# Patient Record
Sex: FEMALE | Race: WHITE | Employment: FULL TIME | URBAN - METROPOLITAN AREA
[De-identification: names, ages, dates, MRNs, and addresses within clinical notes are randomized per-mention and may not be internally consistent; named-entity substitution may affect disease eponyms.]

---

## 2017-10-10 ENCOUNTER — TRANSFERRED RECORDS (OUTPATIENT)
Dept: MULTI SPECIALTY CLINIC | Facility: CLINIC | Age: 37
End: 2017-10-10

## 2017-10-10 LAB — PAP-ABSTRACT: NORMAL

## 2018-04-11 ENCOUNTER — RECORDS - HEALTHEAST (OUTPATIENT)
Dept: LAB | Facility: HOSPITAL | Age: 38
End: 2018-04-11

## 2018-04-12 LAB — HBV SURFACE AB SERPL IA-ACNC: POSITIVE M[IU]/ML

## 2018-04-13 LAB
MEV IGG SER IA-ACNC: POSITIVE
MUV IGG SER QL IA: POSITIVE
QTF INTERPRETATION: NORMAL
QTF MITOGEN - NIL: >10 IU/ML
QTF NIL: 0.03 IU/ML
QTF RESULT: NEGATIVE
QTF TB ANTIGEN - NIL: 0.23 IU/ML

## 2018-10-10 ENCOUNTER — TRANSFERRED RECORDS (OUTPATIENT)
Dept: MULTI SPECIALTY CLINIC | Facility: CLINIC | Age: 38
End: 2018-10-10

## 2018-10-10 DIAGNOSIS — Z53.9 ERRONEOUS ENCOUNTER--DISREGARD: Primary | ICD-10-CM

## 2020-01-20 ENCOUNTER — APPOINTMENT (OUTPATIENT)
Dept: GENERAL RADIOLOGY | Facility: CLINIC | Age: 40
End: 2020-01-20
Attending: EMERGENCY MEDICINE
Payer: COMMERCIAL

## 2020-01-20 ENCOUNTER — HOSPITAL ENCOUNTER (EMERGENCY)
Facility: CLINIC | Age: 40
Discharge: HOME | End: 2020-01-21
Attending: EMERGENCY MEDICINE | Admitting: EMERGENCY MEDICINE
Payer: COMMERCIAL

## 2020-01-20 DIAGNOSIS — R05.9 COUGH: ICD-10-CM

## 2020-01-20 DIAGNOSIS — R06.02 SHORTNESS OF BREATH: ICD-10-CM

## 2020-01-20 DIAGNOSIS — R07.9 CHEST PAIN, UNSPECIFIED TYPE: ICD-10-CM

## 2020-01-20 LAB
ALBUMIN SERPL-MCNC: 3.6 G/DL (ref 3.4–5)
ALP SERPL-CCNC: 37 U/L (ref 40–150)
ALT SERPL W P-5'-P-CCNC: 21 U/L (ref 0–50)
ANION GAP SERPL CALCULATED.3IONS-SCNC: 5 MMOL/L (ref 3–14)
AST SERPL W P-5'-P-CCNC: 16 U/L (ref 0–45)
BASOPHILS # BLD AUTO: 0 10E9/L (ref 0–0.2)
BASOPHILS NFR BLD AUTO: 0.6 %
BILIRUB SERPL-MCNC: 0.2 MG/DL (ref 0.2–1.3)
BUN SERPL-MCNC: 11 MG/DL (ref 7–30)
CALCIUM SERPL-MCNC: 8.5 MG/DL (ref 8.5–10.1)
CHLORIDE SERPL-SCNC: 111 MMOL/L (ref 94–109)
CO2 SERPL-SCNC: 28 MMOL/L (ref 20–32)
CREAT SERPL-MCNC: 0.86 MG/DL (ref 0.52–1.04)
D DIMER PPP FEU-MCNC: 0.5 UG/ML FEU (ref 0–0.5)
DIFFERENTIAL METHOD BLD: NORMAL
EOSINOPHIL # BLD AUTO: 0.1 10E9/L (ref 0–0.7)
EOSINOPHIL NFR BLD AUTO: 2.1 %
ERYTHROCYTE [DISTWIDTH] IN BLOOD BY AUTOMATED COUNT: 11.9 % (ref 10–15)
GFR SERPL CREATININE-BSD FRML MDRD: 84 ML/MIN/{1.73_M2}
GLUCOSE SERPL-MCNC: 96 MG/DL (ref 70–99)
HCT VFR BLD AUTO: 38.1 % (ref 35–47)
HGB BLD-MCNC: 12.6 G/DL (ref 11.7–15.7)
IMM GRANULOCYTES # BLD: 0 10E9/L (ref 0–0.4)
IMM GRANULOCYTES NFR BLD: 0.2 %
LYMPHOCYTES # BLD AUTO: 2.2 10E9/L (ref 0.8–5.3)
LYMPHOCYTES NFR BLD AUTO: 43.6 %
MCH RBC QN AUTO: 31.3 PG (ref 26.5–33)
MCHC RBC AUTO-ENTMCNC: 33.1 G/DL (ref 31.5–36.5)
MCV RBC AUTO: 95 FL (ref 78–100)
MONOCYTES # BLD AUTO: 0.3 10E9/L (ref 0–1.3)
MONOCYTES NFR BLD AUTO: 5.3 %
NEUTROPHILS # BLD AUTO: 2.5 10E9/L (ref 1.6–8.3)
NEUTROPHILS NFR BLD AUTO: 48.2 %
NRBC # BLD AUTO: 0 10*3/UL
NRBC BLD AUTO-RTO: 0 /100
PLATELET # BLD AUTO: 210 10E9/L (ref 150–450)
POTASSIUM SERPL-SCNC: 3.8 MMOL/L (ref 3.4–5.3)
PROT SERPL-MCNC: 6.7 G/DL (ref 6.8–8.8)
RBC # BLD AUTO: 4.02 10E12/L (ref 3.8–5.2)
SODIUM SERPL-SCNC: 144 MMOL/L (ref 133–144)
TROPONIN I SERPL-MCNC: <0.015 UG/L (ref 0–0.04)
WBC # BLD AUTO: 5.1 10E9/L (ref 4–11)

## 2020-01-20 PROCEDURE — 93010 ELECTROCARDIOGRAM REPORT: CPT | Mod: Z6 | Performed by: EMERGENCY MEDICINE

## 2020-01-20 PROCEDURE — 99285 EMERGENCY DEPT VISIT HI MDM: CPT | Mod: 25 | Performed by: EMERGENCY MEDICINE

## 2020-01-20 PROCEDURE — 85025 COMPLETE CBC W/AUTO DIFF WBC: CPT | Performed by: EMERGENCY MEDICINE

## 2020-01-20 PROCEDURE — 80053 COMPREHEN METABOLIC PANEL: CPT | Performed by: EMERGENCY MEDICINE

## 2020-01-20 PROCEDURE — 96374 THER/PROPH/DIAG INJ IV PUSH: CPT | Performed by: EMERGENCY MEDICINE

## 2020-01-20 PROCEDURE — 85379 FIBRIN DEGRADATION QUANT: CPT | Performed by: EMERGENCY MEDICINE

## 2020-01-20 PROCEDURE — 93005 ELECTROCARDIOGRAM TRACING: CPT | Performed by: EMERGENCY MEDICINE

## 2020-01-20 PROCEDURE — 71046 X-RAY EXAM CHEST 2 VIEWS: CPT

## 2020-01-20 PROCEDURE — 84484 ASSAY OF TROPONIN QUANT: CPT | Performed by: EMERGENCY MEDICINE

## 2020-01-20 PROCEDURE — 25000128 H RX IP 250 OP 636: Performed by: EMERGENCY MEDICINE

## 2020-01-20 RX ORDER — KETOROLAC TROMETHAMINE 15 MG/ML
15 INJECTION, SOLUTION INTRAMUSCULAR; INTRAVENOUS ONCE
Status: COMPLETED | OUTPATIENT
Start: 2020-01-20 | End: 2020-01-20

## 2020-01-20 RX ADMIN — KETOROLAC TROMETHAMINE 15 MG: 15 INJECTION, SOLUTION INTRAMUSCULAR; INTRAVENOUS at 22:43

## 2020-01-20 ASSESSMENT — ENCOUNTER SYMPTOMS
WEAKNESS: 0
BACK PAIN: 0
COUGH: 1
BRUISES/BLEEDS EASILY: 0
SORE THROAT: 0
ABDOMINAL PAIN: 0
FEVER: 0
SHORTNESS OF BREATH: 1
CONFUSION: 0
DYSURIA: 0

## 2020-01-20 ASSESSMENT — MIFFLIN-ST. JEOR: SCORE: 1240.6

## 2020-01-20 NOTE — ED AVS SNAPSHOT
[facility], [place], Emergency Department  [address] [place] [address]  Phone:  [phone]                                    Sultana Greene   MRN: [id number]    Department:  [facility], Emergency Department   Date of Visit:  1/20/2020           After Visit Summary Signature Page    I have received my discharge instructions, and my questions have been answered. I have discussed any challenges I see with this plan with the nurse or doctor.    ..........................................................................................................................................  Patient/Patient Representative Signature      ..........................................................................................................................................  Patient Representative Print Name and Relationship to Patient    ..................................................               ................................................  Date                                   Time    ..........................................................................................................................................  Reviewed by Signature/Title    ...................................................              ..............................................  Date                                               Time          22EPIC Rev 08/18

## 2020-01-21 VITALS
DIASTOLIC BLOOD PRESSURE: 72 MMHG | HEIGHT: 68 IN | BODY MASS INDEX: 17.28 KG/M2 | RESPIRATION RATE: 16 BRPM | TEMPERATURE: 98.4 F | HEART RATE: 67 BPM | SYSTOLIC BLOOD PRESSURE: 100 MMHG | WEIGHT: 114 LBS | OXYGEN SATURATION: 99 %

## 2020-01-21 LAB — INTERPRETATION ECG - MUSE: NORMAL

## 2020-01-21 NOTE — ED PROVIDER NOTES
History     Chief Complaint   Patient presents with     Shortness of Breath     Cough     HPI  Sultana Greene is a 39 year old female who has a past medical history of migraine headaches who presents emergency department from home with her  with a complaint of chest pain and shortness of breath.  Patient complains of some chest pain and shortness of breath that started yesterday.  Patient describes the pain as a heaviness that is across to her anterior chest and radiates to her back.  Pain is 8 out of 10 and is been present for the past 2 days.  Patient reports the pain is worse with breathing and taking deep breaths.  Patient denies any relieving factors.  Patient states that she has been sick on and on and off since November.  Patient reports a cough with occasional sputum production.  Patient states that she was seen at [facility] on January 5 for dry cough, patient had a chest x-ray which was unremarkable however patient had 2 kids that was sick with pneumonia so patient was given a course of azithromycin.  Patient took azithromycin from January 5-10 and notes some improvement in her symptoms however yesterday developed a chest pain and shortness of breath so came in for further evaluation.  Patient denies any fever, chills, abdominal pain, nausea, vomiting, diarrhea, lower extremity pain, lower extremity swelling.  Patient denies any oral contraceptive pills.  Last menstrual period was Friday.  Patient reports she smokes tobacco, denies any other drug abuse.    I have reviewed the Medications, Allergies, Past Medical and Surgical History, and Social History in the Epic system.    Review of Systems   Constitutional: Negative for fever.   HENT: Negative for sore throat.    Eyes: Negative for visual disturbance.   Respiratory: Positive for cough and shortness of breath.    Cardiovascular: Positive for chest pain.   Gastrointestinal: Negative for abdominal pain.   Endocrine: Negative for  "polyuria.   Genitourinary: Negative for dysuria.   Musculoskeletal: Negative for back pain.   Skin: Negative for rash.   Allergic/Immunologic: Negative for immunocompromised state.   Neurological: Negative for weakness.   Hematological: Does not bruise/bleed easily.   Psychiatric/Behavioral: Negative for confusion.       Physical Exam   BP: 91/62  Pulse: 67  Heart Rate: 86  Temp: 98.4  F (36.9  C)  Resp: 16  Height: 172.7 cm (5' 8\")  Weight: 51.7 kg (114 lb)  SpO2: 99 %      Physical Exam  General: no acute distress. Appears stated age.   HENT: MMM, no oropharyngeal lesions; +TTP over maxillary sinus  Eyes: PERRL, normal sclerae  Neck: non-tender, supple  Cardio: regulra rate. Regular rhythm. Extremities well perfused  Resp: Normal work of breathing, clear breath sounds  Chest/Back: no visual signs of trauma, no CVA tenderness  Abdomen: no tenderness, non-distended, no rebound, no guarding  Neuro: alert and fully oriented. CN II-XII grossly intact. Grossly normal strength and sensation in all extremities.   MSK: no deformities.   Integumentary/Skin: no rash visualized, normal color  Psych: normal affect, normal behavior    ED Course        Procedures          EKG Interpretation:      Interpreted by Jessica Wang MD  Time reviewed: 2211  Symptoms at time of EKG: chest pain   Rhythm: normal sinus   Rate: normal  Axis: normal  Ectopy: none  Conduction: normal  ST Segments/ T Waves: No ST-T wave changes  Q Waves: none  Comparison to prior: No old EKG available    Clinical Impression: normal EKG, no acute ischemic change      .  Results for orders placed or performed during the hospital encounter of 01/20/20   CBC with platelets differential     Status: None   Result Value Ref Range    WBC 5.1 4.0 - 11.0 10e9/L    RBC Count 4.02 3.8 - 5.2 10e12/L    Hemoglobin 12.6 11.7 - 15.7 g/dL    Hematocrit 38.1 35.0 - 47.0 %    MCV 95 78 - 100 fl    MCH 31.3 26.5 - 33.0 pg    MCHC 33.1 31.5 - 36.5 g/dL    RDW 11.9 10.0 - 15.0 " %    Platelet Count 210 150 - 450 10e9/L    Diff Method Automated Method     % Neutrophils 48.2 %    % Lymphocytes 43.6 %    % Monocytes 5.3 %    % Eosinophils 2.1 %    % Basophils 0.6 %    % Immature Granulocytes 0.2 %    Nucleated RBCs 0 0 /100    Absolute Neutrophil 2.5 1.6 - 8.3 10e9/L    Absolute Lymphocytes 2.2 0.8 - 5.3 10e9/L    Absolute Monocytes 0.3 0.0 - 1.3 10e9/L    Absolute Eosinophils 0.1 0.0 - 0.7 10e9/L    Absolute Basophils 0.0 0.0 - 0.2 10e9/L    Abs Immature Granulocytes 0.0 0 - 0.4 10e9/L    Absolute Nucleated RBC 0.0    D dimer quantitative     Status: None   Result Value Ref Range    D Dimer 0.5 0.0 - 0.50 ug/ml FEU   Comprehensive metabolic panel     Status: Abnormal   Result Value Ref Range    Sodium 144 133 - 144 mmol/L    Potassium 3.8 3.4 - 5.3 mmol/L    Chloride 111 (H) 94 - 109 mmol/L    Carbon Dioxide 28 20 - 32 mmol/L    Anion Gap 5 3 - 14 mmol/L    Glucose 96 70 - 99 mg/dL    Urea Nitrogen 11 7 - 30 mg/dL    Creatinine 0.86 0.52 - 1.04 mg/dL    GFR Estimate 84 >60 mL/min/[1.73_m2]    GFR Estimate If Black >90 >60 mL/min/[1.73_m2]    Calcium 8.5 8.5 - 10.1 mg/dL    Bilirubin Total 0.2 0.2 - 1.3 mg/dL    Albumin 3.6 3.4 - 5.0 g/dL    Protein Total 6.7 (L) 6.8 - 8.8 g/dL    Alkaline Phosphatase 37 (L) 40 - 150 U/L    ALT 21 0 - 50 U/L    AST 16 0 - 45 U/L   Troponin I     Status: None   Result Value Ref Range    Troponin I ES <0.015 0.000 - 0.045 ug/L   EKG 12-lead, tracing only     Status: None (Preliminary result)   Result Value Ref Range    Interpretation ECG Click View Image link to view waveform and result          Assessments & Plan (with Medical Decision Making)   Sultana Greene is a 39 year old female who has a past medical history of migraine headaches who presents emergency department from home with her  with a complaint of chest pain and shortness of breath.  Upon arrival patient is well-appearing, afebrile, no distress.  Patient hemodynamically stable and vital  signs within normal limits.  I reviewed EKG which demonstrates normal sinus rhythm with a ventricular rate of 74 beats per with no acute ischemic change, no prior EKG for comparison.  Differential diagnosis includes but is not limited to viral illness versus upper respiratory infection versus bronchitis versus pneumonia versus costochondritis versus pleurisy versus less likely pneumothorax, cardiac, pulmonary embolism.  At this time will plan for comprehensive labs, Toradol for pain, and reevaluate.    I Reviewed comprehensive labs which are unremarkable white blood cell count 5.1, hemoglobin 12.6, no acute metabolic electrolyte abnormality, negative troponin, negative d-dimer.  I reviewed chest x-ray which are unremarkable with no acute cardiopulmonary process, no pneumonia, effusion, pneumothorax.  I discussed results with patient and .  On reevaluation patient is feeling better after Toradol.  Discussed with patient unclear etiology of patient's chest pain however could be viral illness versus pleurisy versus costochondritis.  At this time no emergent need for antibiotics.  Recommend discharge home with continue supportive care, rest, hydration, Tylenol ibuprofen as needed for pain.  Recommend close follow-up with her primary care provider in the next 2 to 3 days for further evaluation and follow-up and return precautions discussed if persistent high fever, recurrent or worsening chest pain, shortness of breath, weakness, or any worsening symptoms.  Patient and  understand agree with the plan. .The patient is discharged home with instructions to return if their symptoms persist or worsen.  Plan for close follow-up with their primary physician.  I discussed workup, results, treatment, and plan with the patient.  Patient understands and agrees with the plan.        I have reviewed the nursing notes.    I have reviewed the findings, diagnosis, plan and need for follow up with the patient.    New  Prescriptions    No medications on file       Final diagnoses:   Chest pain, unspecified type   Cough   Shortness of breath       1/20/2020   [facility], [place], EMERGENCY DEPARTMENT     Jessica Wang MD  01/21/20 0102

## 2020-01-21 NOTE — ED TRIAGE NOTES
Treated for pneumonia 1/5, reports symptoms getting worse, difficulty breathing, productive cough with clear secretions.

## 2020-01-21 NOTE — DISCHARGE INSTRUCTIONS
Thank you for your patience today.  Please follow-up with your regular doctor in the next 2-3 days for further evaluation and follow-up care.  Please call to schedule an appointment.  Please continue your own medications.  Please rest, drink plenty of fluids, you may take tylenol or ibuprofen as needed for pain. Please return to the ER if you develop persistent high fever, chest pain, shortness of breath, or any worsening of your current symptoms.  It was a pleasure taking care of you today.  We hope you feel better soon.

## 2020-06-11 ENCOUNTER — TELEPHONE (OUTPATIENT)
Dept: OBGYN | Facility: CLINIC | Age: 40
End: 2020-06-11

## 2020-06-11 NOTE — TELEPHONE ENCOUNTER
RN called and assisted pt in scheduling New Prenatal appt. This is pt's 5th pregnancy, pt denied Nurse Ed appt.    Pt requested Dr. Casarez, LMP was approx. 4/29/2020. Plans to deliver at [facility].    Wanda Bautista RN on 6/11/2020 at 3:18 PM

## 2020-06-11 NOTE — TELEPHONE ENCOUNTER
Reason for call:  Other   Patient called regarding (reason for call): appointment  Additional comments: patient is calling to establish appointment at the [facility]  OB clinic.    Phone number to reach patient:  Home number on file [phone] (home)    Best Time:  anytime    Can we leave a detailed message on this number?  YES    Travel screening: Not Applicable

## 2020-07-06 ENCOUNTER — PRENATAL OFFICE VISIT (OUTPATIENT)
Dept: OBGYN | Facility: CLINIC | Age: 40
End: 2020-07-06
Payer: COMMERCIAL

## 2020-07-06 VITALS
HEIGHT: 68 IN | DIASTOLIC BLOOD PRESSURE: 72 MMHG | OXYGEN SATURATION: 99 % | WEIGHT: 110 LBS | HEART RATE: 110 BPM | BODY MASS INDEX: 16.67 KG/M2 | SYSTOLIC BLOOD PRESSURE: 106 MMHG

## 2020-07-06 DIAGNOSIS — Z34.80 SUPERVISION OF OTHER NORMAL PREGNANCY, ANTEPARTUM: ICD-10-CM

## 2020-07-06 DIAGNOSIS — Z72.0 TOBACCO USE: ICD-10-CM

## 2020-07-06 DIAGNOSIS — O09.529 ANTEPARTUM MULTIGRAVIDA OF ADVANCED MATERNAL AGE: ICD-10-CM

## 2020-07-06 LAB
ERYTHROCYTE [DISTWIDTH] IN BLOOD BY AUTOMATED COUNT: 12 % (ref 10–15)
HCT VFR BLD AUTO: 40.3 % (ref 35–47)
HGB BLD-MCNC: 13.4 G/DL (ref 11.7–15.7)
MCH RBC QN AUTO: 31.4 PG (ref 26.5–33)
MCHC RBC AUTO-ENTMCNC: 33.3 G/DL (ref 31.5–36.5)
MCV RBC AUTO: 94 FL (ref 78–100)
PLATELET # BLD AUTO: 195 10E9/L (ref 150–450)
RBC # BLD AUTO: 4.27 10E12/L (ref 3.8–5.2)
WBC # BLD AUTO: 5.5 10E9/L (ref 4–11)

## 2020-07-06 PROCEDURE — 36415 COLL VENOUS BLD VENIPUNCTURE: CPT | Performed by: OBSTETRICS & GYNECOLOGY

## 2020-07-06 PROCEDURE — 87389 HIV-1 AG W/HIV-1&-2 AB AG IA: CPT | Performed by: OBSTETRICS & GYNECOLOGY

## 2020-07-06 PROCEDURE — 85027 COMPLETE CBC AUTOMATED: CPT | Performed by: OBSTETRICS & GYNECOLOGY

## 2020-07-06 PROCEDURE — 99203 OFFICE O/P NEW LOW 30 MIN: CPT | Performed by: OBSTETRICS & GYNECOLOGY

## 2020-07-06 PROCEDURE — 87086 URINE CULTURE/COLONY COUNT: CPT | Performed by: OBSTETRICS & GYNECOLOGY

## 2020-07-06 PROCEDURE — 86901 BLOOD TYPING SEROLOGIC RH(D): CPT | Performed by: OBSTETRICS & GYNECOLOGY

## 2020-07-06 PROCEDURE — 86762 RUBELLA ANTIBODY: CPT | Performed by: OBSTETRICS & GYNECOLOGY

## 2020-07-06 PROCEDURE — 86780 TREPONEMA PALLIDUM: CPT | Performed by: OBSTETRICS & GYNECOLOGY

## 2020-07-06 PROCEDURE — 86900 BLOOD TYPING SEROLOGIC ABO: CPT | Performed by: OBSTETRICS & GYNECOLOGY

## 2020-07-06 PROCEDURE — 87340 HEPATITIS B SURFACE AG IA: CPT | Performed by: OBSTETRICS & GYNECOLOGY

## 2020-07-06 PROCEDURE — 86850 RBC ANTIBODY SCREEN: CPT | Performed by: OBSTETRICS & GYNECOLOGY

## 2020-07-06 RX ORDER — PRENATAL VIT/IRON FUM/FOLIC AC 27MG-0.8MG
1 TABLET ORAL DAILY
COMMUNITY
End: 2020-09-15

## 2020-07-06 ASSESSMENT — MIFFLIN-ST. JEOR: SCORE: 1217.46

## 2020-07-06 NOTE — Clinical Note
Please abstract the following data from this visit with this patient into the appropriate field in Epic:    Tests that can be patient reported without a hard copy:    Pap smear done on this date: 10.10.17 (approximately), by this group: [facility], results were NIL.     Other Tests found in the patient's chart through Chart Review/Care Everywhere:        Note to Abstraction: If this section is blank, no results were found via Chart Review/Care Everywhere.

## 2020-07-06 NOTE — PATIENT INSTRUCTIONS
If you have any questions regarding your visit, Please contact your care team.     [employer]: [phone]  [facility] CLINIC HOURS TELEPHONE NUMBER       Kenneth Casarez M.D.      Mayo Melendrez-Medical Assistant    [place]-  [place]-     Monday-[place]  8:00a.m-4:45 p.m  Tuesday-[place]  9:00a.m-4:00 p.m  Wednesday-[place] 8:00a.m-4:45 p.m.  Thursday-[place]  8:00a.m-4:45 p.m.  Friday-[place]  8:00a.m-4:45 p.m. [facility]  [address]  [place], [place] [address] [phone] ask [facility]  [phone] Fax  Imaging [phone]    [facility]  [address]  [place], [place] [address] [phone]    [facility]  [address] [place] [address] [phone] [facility]  [phone] Fax  Imaging [phone]     Urgent Care locations:    [facility] Monday-Friday  5 pm - 9 pm  Saturday and Sunday   9 am - 5 pm  Monday-Friday   11 am - 9 pm  Saturday and Sunday   9 am - 5 pm   [phone]   If you need a medication refill, please contact your pharmacy. Please allow 3 business days for your refill to be completed.  As always, Thank you for trusting us with your healthcare needs!

## 2020-07-07 LAB
ABO + RH BLD: NORMAL
ABO + RH BLD: NORMAL
BACTERIA SPEC CULT: NO GROWTH
BLD GP AB SCN SERPL QL: NORMAL
BLOOD BANK CMNT PATIENT-IMP: NORMAL
HBV SURFACE AG SERPL QL IA: NONREACTIVE
HIV 1+2 AB+HIV1 P24 AG SERPL QL IA: NONREACTIVE
Lab: NORMAL
RUBV IGG SERPL IA-ACNC: 27 IU/ML
SPECIMEN EXP DATE BLD: NORMAL
SPECIMEN SOURCE: NORMAL
T PALLIDUM AB SER QL: NONREACTIVE

## 2020-07-07 NOTE — PROGRESS NOTES
Sultana Greene is a 40 year old year old G 5 P 4 who presents for an initial obstetrical visit.  Referred by self. Dr. Jensen at [facility] retired.     Currently experiencing normal pregnancy symptoms without particular problems including pain, bleeding, marked vomiting or weight loss except: no exceptions.  This was not a planned pregnancy but accepting. Plans to parent. No contraception. Plans this to be last pregnancy.   Here today alone.   Additional concerns: dates and irregular menses, AMA, smoker and encouraged cessation as she did in other pregnancies, fast labors.    Discussed special diagnostic and screening tests and plans (y = yes, n = no/declined, p = possibly/considering): first trimester scr with NT and later AFP or with cell free DNA and later AFP = n, cell free DNA= n, CF carrier scr = n, hemoglobinopathy scr= n, SMA, fragile X  and other genetic scr= n, genetic amnio/CVS = n, quad scr = n, survey u/s = n, Level 2 survey u/s with MFM = y.      ROS:     Systems reviewed include constitutional; breast;                 cardiac; respiratory; gastrointestinal; genitourinary;                                musculoskeletal; integumentary; psychological;                                hematologic/lymphatic and endocrine.                  These systems were negative for significant symptoms except                 for the following: none and see above HPI.    Past medical, obstetrical, surgical, family and social history reviewed and as noted or updated in chart.     Allergies, meds and supplements are as noted or updated in chart.                    Episode OB dating, demographic and history forms completed or reviewed.    EXAM:  VS as noted. BMI- Body mass index is 16.73 kg/m .    Relatively recent normal general exam- not repeated now.       Sultana was seen today for prenatal care.    Diagnoses and all orders for this visit:    Supervision of other normal pregnancy, antepartum  -     ABO/Rh type and  screen  -     CBC with platelets  -     Hepatitis B surface antigen  -     Rubella Antibody IgG Quantitative  -     Urine Culture Aerobic Bacterial  -     HIV Antigen Antibody Combo  -     Treponema Abs w Reflex to RPR and Titer  -     US OB < 14 Weeks Single; Future    Antepartum multigravida of advanced maternal age    Tobacco use        PLAN: Total encounter time (physician together with patient) = 30min. Direct counseling, education and care coordination time (physician together with patient) = 20min.This counseling included the following: Advice appropriate to gestational age reviewed including pertinent Checklist items. Discussed condition, tests and general care plan. Symptoms, problems and concerns reviewed. Recommended weight gain discussed. Problem list initiated.   Check ultrasound now. Pap due postpartum. Orders as noted. RTC in 4 weeks.     Kenneth Casarez MD

## 2020-07-17 ENCOUNTER — ANCILLARY PROCEDURE (OUTPATIENT)
Dept: ULTRASOUND IMAGING | Facility: CLINIC | Age: 40
End: 2020-07-17
Attending: OBSTETRICS & GYNECOLOGY
Payer: COMMERCIAL

## 2020-07-17 DIAGNOSIS — Z34.80 SUPERVISION OF OTHER NORMAL PREGNANCY, ANTEPARTUM: ICD-10-CM

## 2020-07-17 PROCEDURE — 76801 OB US < 14 WKS SINGLE FETUS: CPT

## 2020-08-18 ENCOUNTER — TELEPHONE (OUTPATIENT)
Dept: OBGYN | Facility: CLINIC | Age: 40
End: 2020-08-18

## 2020-08-18 NOTE — TELEPHONE ENCOUNTER
Reason for call:  Patient reporting a symptom    Symptom or request: Spotting    Duration (how long have symptoms been present): 1 day    Have you been treated for this before? No    Additional comments: n/a    Phone Number patient can be reached at:  Cell number on file:    Telephone Information:   Mobile [phone]       Best Time:  Anytime.    Can we leave a detailed message on this number:  YES    Call taken on 8/18/2020 at 4:01 PM by Jaye Beverly

## 2020-08-18 NOTE — TELEPHONE ENCOUNTER
, 15w6d.    Pt states she noticed some spotting on her toilet paper after she wiped after going to the bathroom today x2.  The blood that she noticed was brown.  Denies any blood in her underwear and not having to wear a pad. Denies vaginal itching, burning or a foul smelling discharge.  Denies urinary symptoms.  Denies abdominal cramping.    Pt recently had intercourse and has been struggling with bowel movements.  She states her BMs are not regular and she often times has to strain to have one.    I suggested that she may be noticing some spotting due to recent intercourse and/or straining with BMs.  I suggested to increase water intake, increase dietary fiber and take a daily stool softener to get BMs more regular.  I advised to monitor spotting and if it increases or other symptoms occur to call.  Pt is scheduled to see Dr. Casarez on Friday and can discuss further at that time.    Pt verbalized understanding and agreed to plan.  Christi Pollard RN

## 2020-08-19 ENCOUNTER — TELEPHONE (OUTPATIENT)
Dept: OBGYN | Facility: CLINIC | Age: 40
End: 2020-08-19

## 2020-08-19 ENCOUNTER — ANCILLARY PROCEDURE (OUTPATIENT)
Dept: ULTRASOUND IMAGING | Facility: CLINIC | Age: 40
End: 2020-08-19
Attending: NURSE PRACTITIONER
Payer: COMMERCIAL

## 2020-08-19 ENCOUNTER — PRENATAL OFFICE VISIT (OUTPATIENT)
Dept: OBGYN | Facility: CLINIC | Age: 40
End: 2020-08-19
Payer: COMMERCIAL

## 2020-08-19 VITALS
BODY MASS INDEX: 18.28 KG/M2 | HEIGHT: 68 IN | SYSTOLIC BLOOD PRESSURE: 110 MMHG | OXYGEN SATURATION: 98 % | TEMPERATURE: 98.8 F | DIASTOLIC BLOOD PRESSURE: 74 MMHG | HEART RATE: 103 BPM | WEIGHT: 120.6 LBS

## 2020-08-19 DIAGNOSIS — O20.0 THREATENED ABORTION: Primary | ICD-10-CM

## 2020-08-19 DIAGNOSIS — O20.0 THREATENED ABORTION: ICD-10-CM

## 2020-08-19 DIAGNOSIS — O02.1 MISSED ABORTION: Primary | ICD-10-CM

## 2020-08-19 PROCEDURE — 99213 OFFICE O/P EST LOW 20 MIN: CPT | Performed by: NURSE PRACTITIONER

## 2020-08-19 PROCEDURE — 76801 OB US < 14 WKS SINGLE FETUS: CPT

## 2020-08-19 ASSESSMENT — PAIN SCALES - GENERAL: PAINLEVEL: NO PAIN (0)

## 2020-08-19 ASSESSMENT — MIFFLIN-ST. JEOR: SCORE: 1257.6

## 2020-08-19 NOTE — PROGRESS NOTES
"Subjective     Sultana Greene is a 40 year old female who presents to clinic today for the following health issues:    HPI     Possible miscarriage   Patient should be 16 weeks gestation and called yesterday as she was experiencing some brown intermittent bleeding. Today called back as it was bright red and each time she was going to the bathroom it was filling the toilet paper. No bleeding now since about lunch time, no cramping during this time, no passage of clots. Patient was scheduled to be seen this afternoon, I had ordered ultrasound to be done prior to visit.   Patient is Rh positive.    Review of Systems   Constitutional, HEENT, cardiovascular, pulmonary, gi and gu systems are negative, except as otherwise noted.      Objective    /74 (BP Location: Right arm, Patient Position: Sitting, Cuff Size: Adult Regular)   Pulse 103   Temp 98.8  F (37.1  C) (Tympanic)   Ht 1.715 m (5' 7.5\")   Wt 54.7 kg (120 lb 9.6 oz)   LMP 2020 (Within Days)   SpO2 98%   BMI 18.61 kg/m    Body mass index is 18.61 kg/m .  Physical Exam   GENERAL: healthy, alert and no distress  RESP: lungs clear to auscultation - no rales, rhonchi or wheezes  CV: regular rate and rhythm, normal S1 S2, no S3 or S4, no murmur, click or rub, no peripheral edema and peripheral pulses strong  ABDOMEN: soft, nontender, no hepatosplenomegaly, no masses and bowel sounds normal  MS: no gross musculoskeletal defects noted, no edema  SKIN: no suspicious lesions or rashes  PSYCH: mentation appears normal, affect normal/bright    Results for orders placed or performed in visit on 20 (from the past 24 hour(s))   US OB < 14 Weeks Single    Narrative    ULTRASOUND OBSTETRIC LESS THAN 14 WEEKS SINGLE  2020 11:30 AM    HISTORY: Bleeding in second trimester-approximately 16 weeks.  Threatened .    TECHNIQUE: Transabdominal and transvaginal imaging were performed.     COMPARISON: First trimester ultrasound " 2020.    FINDINGS:      Estimated gestational age by current ultrasound measurement: 13 weeks  3 days.  Estimated date of delivery based on this ultrasound: Not applicable.  Patient reported LMP: 2020.  Estimated gestational age by reported LMP: 17 weeks 4 days.  Gestational sac: Unremarkable..    Crown-rump length: 7.3 cm.   Embryonic cardiac activity: Not identified.  Yolk sac: Not present. Anterior placental tissue is noted.  Subchorionic hemorrhage: None.    Right ovary: Not identified.  Left ovary: Not identified.  Adnexal mass: None.  Free pelvic fluid: None.      Impression    IMPRESSION: Intrauterine gestational sac with anterior placental  tissue and fetal pole measuring 13 weeks 3 days. No fetal cardiac  activity is identified consistent with fetal demise. Estimated  gestational age based on LMP dating should be 17 weeks 4 days with a  stated LMP date 2020. Correlate with beta hCG. Impending  spontaneous  is suspected.    KIRK MESSINA MD           Assessment & Plan     Missed   Discussed ultrasound results with patient and her , they are both understandably tearful today. Patient reassured there were nothing she did to cause this or could have done to prevent this. She would like to move forward with planning surgical intervention. Per request, consult appointment scheduled for tomorrow with Dr. Holden. Discussed need for pelvic rest at this time, no tampons, no intercourse.   Warning signs to monitor for and report such as heavy vaginal bleeding, abdominal pain discussed and patient verbalizes understand. She is also aware of symptoms for which she would need ED visit sooner than her schedule appointment. Patient is given an opportunity to ask questions and have them answered.     ESTEVAN Wang [facility]

## 2020-08-19 NOTE — TELEPHONE ENCOUNTER
Patient 16 weeks with increase in spotting. Can we see if she is able to have an ultrasound completed prior to her appointment today? My concern is if we try to schedule at time of her appointment, we may not get her in today to have it completed. I have placed the order. Thank you. Maryjo BARRETO CNP

## 2020-08-19 NOTE — TELEPHONE ENCOUNTER
RN called and gave pt US scheduling number to try to get US done prior to her appt.    Patient verbalized understanding and agreed to plan.   Patient was given the opportunity to ask additional questions and have them answered. Patient had no further questions.   Wanda Bautista RN on 8/19/2020 at 10:22 AM

## 2020-08-19 NOTE — TELEPHONE ENCOUNTER
Pt currently 16w0d, , pt having bleeding when using bathroom, bright red since this morning, had brown spotting yesterday afternoon denies and cramping or needing to wear a pad. Denies straining, recent intercourse, or strenuous activity.    Pt wanted appt for today, RN assisted in scheduling with Maryjo in [place]. RN also reviewed bleeding precautions when to be seen in ED.    Patient verbalized understanding and agreed to plan.   Patient was given the opportunity to ask additional questions and have them answered. Patient had no further questions.   Wanda Bautista RN on 2020 at 10:15 AM

## 2020-08-19 NOTE — TELEPHONE ENCOUNTER
Reason for call:  Patient reporting a symptom    Symptom or request: Symptoms    Duration (how long have symptoms been present): on going    Have you been treated for this before? Yes    Additional comments: Pt would like a call back for she feels she is experiencing a miscarriage    Phone Number patient can be reached at:  Home number on file [phone] (home)    Best Time:  anytime    Can we leave a detailed message on this number:  YES    Call taken on 8/19/2020 at 10:03 AM by Juan Manuel Boothe

## 2020-08-20 ENCOUNTER — HOSPITAL ENCOUNTER (OUTPATIENT)
Facility: AMBULATORY SURGERY CENTER | Age: 40
End: 2020-08-20
Attending: OBSTETRICS & GYNECOLOGY | Admitting: OBSTETRICS & GYNECOLOGY
Payer: COMMERCIAL

## 2020-08-20 ENCOUNTER — OFFICE VISIT (OUTPATIENT)
Dept: OBGYN | Facility: CLINIC | Age: 40
End: 2020-08-20
Payer: COMMERCIAL

## 2020-08-20 ENCOUNTER — TELEPHONE (OUTPATIENT)
Dept: OBGYN | Facility: CLINIC | Age: 40
End: 2020-08-20

## 2020-08-20 VITALS
HEART RATE: 93 BPM | WEIGHT: 120 LBS | DIASTOLIC BLOOD PRESSURE: 74 MMHG | BODY MASS INDEX: 18.52 KG/M2 | TEMPERATURE: 98.6 F | SYSTOLIC BLOOD PRESSURE: 106 MMHG

## 2020-08-20 DIAGNOSIS — Z01.818 PRE-OP EXAM: ICD-10-CM

## 2020-08-20 DIAGNOSIS — O02.1 MISSED ABORTION: Primary | ICD-10-CM

## 2020-08-20 DIAGNOSIS — O02.1 MISSED ABORTION: ICD-10-CM

## 2020-08-20 PROCEDURE — 99214 OFFICE O/P EST MOD 30 MIN: CPT | Performed by: OBSTETRICS & GYNECOLOGY

## 2020-08-20 NOTE — TELEPHONE ENCOUNTER
Associated Diagnoses     Missed  [O02.1]  - Primary        Source Order Set     Order Set Name  Order ID     [id number]    Case Request: Case Info     Panel 1 (Provider: Romulo Holden MD)     Procedures  Laterality  Anesthesia  Region    DILATION AND CURETTAGE, UTERUS, USING SUCTION  N/A  Combined MAC with Local        Requested date:     Location:  OR    Patient class:        Pre-op diagnoses:  Missed     Scheduling Instructions     Additional Instructions for the Case   Procedure notes:  Wants this ASAP.  OK to do at [facility] if necessary   Procedure length:  30 min   Diagnosis:  Missed    Request additional equipment:  NA   Location:  [facility] [place]   Sterilization consent signed:  NA   OR staff assist:     H&P to be completed by [facility] or Maryjo   Post op appointment needed:  4 weeks   Scheduling instructions:   or  is fine   Let me know if I need to place a Covid order        Surgery Scheduled    Date of Surgery  Time of Surgery 1:30 p.m.  Type of Anesthesia Anticipated: Local with MAC  Pre-Op: On  with Maryjo Piper at 10:50 a.m.  Post-Op:  4 weeks on  with Maryjo at 9:10 a.m.  (Per Dr. Holden -  Maryjo would be fine if she is available, otherwise it is fine to wait until 10/1, patient preferred earlier)  Pre-certification routed to Financial Counselors:  Yes    Surgery packet mailed to patient's home address: Yes  Patient instructed NPO 12 hours prior to surgery, arrive 1 hr 15 min hours prior to surgery, Must have a  drop her off at the [facility].  Patient understood and agrees to the plan.      COVID testing:  Demi at the [facility] helped schedule this for  at the [place] site at 11:50.  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      Surgery Pre-Certification    Medical Record Number: [id number]  Sultana Greene  YOB: 1980   Phone: [phone] (home)   Primary Provider: Bandar Stanley  Cedric    Reason for Admit:  Missed  [O02.1]  - Primary      Surgeon: SHIMON Holden MD  Surgical Procedure: DILATION AND CURETTAGE, UTERUS, USING SUCTION   ICD-9 Coded:Missed  [O02.1]  - Primary   Date of Surgery:   Consent signed? N/A    Hospital: [facility]  Outpatient    Requestor:  Ginger Rivera     Location:  [place]

## 2020-08-20 NOTE — PATIENT INSTRUCTIONS
If you have any questions regarding your visit, Please contact your care team.    [facility]: [phone]      Women s Health CLINIC HOURS TELEPHONE NUMBER   MD Jaymie Steve CMA Susie -    KEITH Arroyo       Monday:       7:30-4:15 [place]  Wednesday:      Administrative  Thursday:       7:30-4:15 [place]  Friday:       Administrative     [facility]  [address] [place]  [address]  [phone] ask for [facility]  [address] [place] [address]  [phone] ask for [facility]    Imaging Scheduling for [place]:  [phone]    Imaging Scheduling for [place]: [phone]       Urgent Care locations:    [facility] Saturday and Sunday   9 am - 5 pm    Monday-Friday   12 pm - 8 pm  Saturday and Sunday   9 am - 5 pm   [phone]     [facility] Labor and Delivery:  [phone]    If you need a medication refill, please contact your pharmacy. Please allow 3 business days for your refill to be completed.  As always, Thank you for trusting us with your healthcare needs!

## 2020-08-20 NOTE — PROGRESS NOTES
Sultana is a 40 year old   here for follow up of early miscarriage diagnosed yesterday  She is 16 weeks by dates, but 13 weeks by ultrasound.  ROS: No urinary frequency or dysuria, bladder or kidney problems  ROS: Ten point review of systems was reviewed and negative except the above.    PMH: Her past medical, surgical, and obstetric histories were reviewed and are documented in their appropriate chart areas.    ALL/Meds: Her medication and allergy histories were reviewed and are documented in their appropriate chart areas.    SH/FMH: Reviewed and documented in the appropriate area.    PE: /74   Pulse 93   Temp 98.6  F (37  C) (Tympanic)   Wt 54.4 kg (120 lb)   LMP 2020 (Within Days)   Breastfeeding No   BMI 18.52 kg/m      Reviewed that miscarriage occurs ~ 1 in 5 pregnancy events and that there was no direct event or prevention  that the patient could have avoided or performed.  Discussed that there are many etiologies for miscarriage, the most common being a genetic anomaly that occurs within the pregnancy itself..  Reviewed that risk of miscarriage for next pregnancy is not elevated by the current event.    Reviewed options of expectant management, D&C, and medical therapy (cytotec).  Reviewed risks and benefits of all options.  All questions answered.  Patient is opting for DILATION AND CURRETAGE.  Discussed the risks of a DILATION AND CURRETAGE including the risks of bleeding, infection as well as perforation with injury to the bowel/bladder.  Discussed the outpatient nature and followup needed.  She does agree to proceed..  .    A/P:   Sultana presents with (O02.1) Missed   (primary encounter diagnosis)  Comment: Out of 30 minutes spent face to face with the patient, > 50% of this was spent in consultation.  Plan: Siuction DILATION AND CURRETAGE         -    No orders of the defined types were placed in this encounter.        Romulo Holden MD FACOG   Statement Selected

## 2020-08-21 ENCOUNTER — HOSPITAL ENCOUNTER (EMERGENCY)
Facility: CLINIC | Age: 40
Discharge: HOME | End: 2020-08-21
Attending: EMERGENCY MEDICINE | Admitting: EMERGENCY MEDICINE
Payer: COMMERCIAL

## 2020-08-21 ENCOUNTER — APPOINTMENT (OUTPATIENT)
Dept: ULTRASOUND IMAGING | Facility: CLINIC | Age: 40
End: 2020-08-21
Attending: EMERGENCY MEDICINE
Payer: COMMERCIAL

## 2020-08-21 ENCOUNTER — TELEPHONE (OUTPATIENT)
Dept: OBGYN | Facility: CLINIC | Age: 40
End: 2020-08-21

## 2020-08-21 VITALS
TEMPERATURE: 97.3 F | HEART RATE: 91 BPM | DIASTOLIC BLOOD PRESSURE: 63 MMHG | SYSTOLIC BLOOD PRESSURE: 90 MMHG | OXYGEN SATURATION: 98 % | RESPIRATION RATE: 16 BRPM

## 2020-08-21 DIAGNOSIS — O03.9 SPONTANEOUS ABORTION: ICD-10-CM

## 2020-08-21 LAB
ABO + RH BLD: NORMAL
ABO + RH BLD: NORMAL
ALBUMIN SERPL-MCNC: 3.4 G/DL (ref 3.4–5)
ALBUMIN UR-MCNC: 100 MG/DL
ALP SERPL-CCNC: 52 U/L (ref 40–150)
ALT SERPL W P-5'-P-CCNC: 21 U/L (ref 0–50)
ANION GAP SERPL CALCULATED.3IONS-SCNC: 5 MMOL/L (ref 3–14)
APPEARANCE UR: ABNORMAL
APTT PPP: 25 SEC (ref 22–37)
AST SERPL W P-5'-P-CCNC: 16 U/L (ref 0–45)
B-HCG SERPL-ACNC: 703 IU/L (ref 0–5)
BASOPHILS # BLD AUTO: 0 10E9/L (ref 0–0.2)
BASOPHILS NFR BLD AUTO: 0.2 %
BILIRUB SERPL-MCNC: 0.4 MG/DL (ref 0.2–1.3)
BILIRUB UR QL STRIP: NEGATIVE
BLD GP AB SCN SERPL QL: NORMAL
BLOOD BANK CMNT PATIENT-IMP: NORMAL
BUN SERPL-MCNC: 10 MG/DL (ref 7–30)
CALCIUM SERPL-MCNC: 9.8 MG/DL (ref 8.5–10.1)
CHLORIDE SERPL-SCNC: 106 MMOL/L (ref 94–109)
CO2 SERPL-SCNC: 25 MMOL/L (ref 20–32)
COLOR UR AUTO: ABNORMAL
CREAT SERPL-MCNC: 0.74 MG/DL (ref 0.52–1.04)
DIFFERENTIAL METHOD BLD: ABNORMAL
EOSINOPHIL # BLD AUTO: 0.1 10E9/L (ref 0–0.7)
EOSINOPHIL NFR BLD AUTO: 0.5 %
ERYTHROCYTE [DISTWIDTH] IN BLOOD BY AUTOMATED COUNT: 12.3 % (ref 10–15)
GFR SERPL CREATININE-BSD FRML MDRD: >90 ML/MIN/{1.73_M2}
GLUCOSE SERPL-MCNC: 96 MG/DL (ref 70–99)
GLUCOSE UR STRIP-MCNC: NEGATIVE MG/DL
HCT VFR BLD AUTO: 43.2 % (ref 35–47)
HGB BLD-MCNC: 14 G/DL (ref 11.7–15.7)
HGB UR QL STRIP: ABNORMAL
IMM GRANULOCYTES # BLD: 0.1 10E9/L (ref 0–0.4)
IMM GRANULOCYTES NFR BLD: 0.7 %
INR PPP: 1.03 (ref 0.86–1.14)
KETONES UR STRIP-MCNC: 5 MG/DL
LACTATE BLD-SCNC: 1.8 MMOL/L (ref 0.7–2)
LEUKOCYTE ESTERASE UR QL STRIP: ABNORMAL
LIPASE SERPL-CCNC: 119 U/L (ref 73–393)
LYMPHOCYTES # BLD AUTO: 1.3 10E9/L (ref 0.8–5.3)
LYMPHOCYTES NFR BLD AUTO: 8.4 %
MCH RBC QN AUTO: 32 PG (ref 26.5–33)
MCHC RBC AUTO-ENTMCNC: 32.4 G/DL (ref 31.5–36.5)
MCV RBC AUTO: 99 FL (ref 78–100)
MONOCYTES # BLD AUTO: 0.7 10E9/L (ref 0–1.3)
MONOCYTES NFR BLD AUTO: 4.6 %
MUCOUS THREADS #/AREA URNS LPF: PRESENT /LPF
NEUTROPHILS # BLD AUTO: 13.2 10E9/L (ref 1.6–8.3)
NEUTROPHILS NFR BLD AUTO: 85.6 %
NITRATE UR QL: NEGATIVE
NRBC # BLD AUTO: 0 10*3/UL
NRBC BLD AUTO-RTO: 0 /100
PH UR STRIP: 7 PH (ref 5–7)
PLATELET # BLD AUTO: 253 10E9/L (ref 150–450)
POTASSIUM SERPL-SCNC: 3.5 MMOL/L (ref 3.4–5.3)
PROT SERPL-MCNC: 7.6 G/DL (ref 6.8–8.8)
RBC # BLD AUTO: 4.37 10E12/L (ref 3.8–5.2)
RBC #/AREA URNS AUTO: >182 /HPF (ref 0–2)
SODIUM SERPL-SCNC: 136 MMOL/L (ref 133–144)
SOURCE: ABNORMAL
SP GR UR STRIP: 1.02 (ref 1–1.03)
SPECIMEN EXP DATE BLD: NORMAL
SQUAMOUS #/AREA URNS AUTO: 1 /HPF (ref 0–1)
UROBILINOGEN UR STRIP-MCNC: 2 MG/DL (ref 0–2)
WBC # BLD AUTO: 15.5 10E9/L (ref 4–11)
WBC #/AREA URNS AUTO: 5 /HPF (ref 0–5)

## 2020-08-21 PROCEDURE — 80053 COMPREHEN METABOLIC PANEL: CPT | Performed by: EMERGENCY MEDICINE

## 2020-08-21 PROCEDURE — 96360 HYDRATION IV INFUSION INIT: CPT | Performed by: EMERGENCY MEDICINE

## 2020-08-21 PROCEDURE — 93005 ELECTROCARDIOGRAM TRACING: CPT | Performed by: EMERGENCY MEDICINE

## 2020-08-21 PROCEDURE — 99284 EMERGENCY DEPT VISIT MOD MDM: CPT | Mod: 25 | Performed by: EMERGENCY MEDICINE

## 2020-08-21 PROCEDURE — 83605 ASSAY OF LACTIC ACID: CPT | Performed by: EMERGENCY MEDICINE

## 2020-08-21 PROCEDURE — 86850 RBC ANTIBODY SCREEN: CPT | Performed by: EMERGENCY MEDICINE

## 2020-08-21 PROCEDURE — 88305 TISSUE EXAM BY PATHOLOGIST: CPT | Performed by: OBSTETRICS & GYNECOLOGY

## 2020-08-21 PROCEDURE — 86900 BLOOD TYPING SEROLOGIC ABO: CPT | Performed by: EMERGENCY MEDICINE

## 2020-08-21 PROCEDURE — 84702 CHORIONIC GONADOTROPIN TEST: CPT | Performed by: EMERGENCY MEDICINE

## 2020-08-21 PROCEDURE — 85730 THROMBOPLASTIN TIME PARTIAL: CPT | Performed by: EMERGENCY MEDICINE

## 2020-08-21 PROCEDURE — 93010 ELECTROCARDIOGRAM REPORT: CPT | Mod: Z6 | Performed by: EMERGENCY MEDICINE

## 2020-08-21 PROCEDURE — 85610 PROTHROMBIN TIME: CPT | Performed by: EMERGENCY MEDICINE

## 2020-08-21 PROCEDURE — 85025 COMPLETE CBC W/AUTO DIFF WBC: CPT | Performed by: EMERGENCY MEDICINE

## 2020-08-21 PROCEDURE — 83690 ASSAY OF LIPASE: CPT | Performed by: EMERGENCY MEDICINE

## 2020-08-21 PROCEDURE — 81001 URINALYSIS AUTO W/SCOPE: CPT | Performed by: EMERGENCY MEDICINE

## 2020-08-21 PROCEDURE — 25000132 ZZH RX MED GY IP 250 OP 250 PS 637: Performed by: STUDENT IN AN ORGANIZED HEALTH CARE EDUCATION/TRAINING PROGRAM

## 2020-08-21 PROCEDURE — 88262 CHROMOSOME ANALYSIS 15-20: CPT | Performed by: OBSTETRICS & GYNECOLOGY

## 2020-08-21 PROCEDURE — 76856 US EXAM PELVIC COMPLETE: CPT

## 2020-08-21 PROCEDURE — 00000159 ZZHCL STATISTIC H-SEND OUTS PREP: Performed by: OBSTETRICS & GYNECOLOGY

## 2020-08-21 PROCEDURE — 25800030 ZZH RX IP 258 OP 636: Performed by: EMERGENCY MEDICINE

## 2020-08-21 PROCEDURE — 88233 TISSUE CULTURE SKIN/BIOPSY: CPT | Performed by: OBSTETRICS & GYNECOLOGY

## 2020-08-21 PROCEDURE — 86901 BLOOD TYPING SEROLOGIC RH(D): CPT | Performed by: EMERGENCY MEDICINE

## 2020-08-21 RX ORDER — SODIUM CHLORIDE 9 MG/ML
INJECTION, SOLUTION INTRAVENOUS CONTINUOUS
Status: DISCONTINUED | OUTPATIENT
Start: 2020-08-21 | End: 2020-08-21 | Stop reason: HOSPADM

## 2020-08-21 RX ORDER — MISOPROSTOL 200 UG/1
200 TABLET ORAL ONCE
Status: COMPLETED | OUTPATIENT
Start: 2020-08-21 | End: 2020-08-21

## 2020-08-21 RX ADMIN — SODIUM CHLORIDE 1000 ML: 900 INJECTION, SOLUTION INTRAVENOUS at 18:36

## 2020-08-21 RX ADMIN — MISOPROSTOL 200 MCG: 200 TABLET ORAL at 20:33

## 2020-08-21 NOTE — TELEPHONE ENCOUNTER
I called Adam.  He stated patient passed everything in the bathtub.  He said she was trying to work on getting the placenta out now but was getting dizzy.  I recommended she go to the ER.  He stated he was going to call an ambulance.  I called the OB on Call, Dr. Vaughan to let him know.    Mackenzie Weiner, DO

## 2020-08-21 NOTE — CONSULTS
"Gynecology Consult Note    Referring Physician: Dr. Leong  Reason for Consult: Spontaneous miscarriage    HPI: Sultana Greene is a 40 year old  who presents to ED with vaginal bleeding. She had a diagnosed missed  yesterday after having a work up for vaginal bleeding in pregnancy. On US yesterday there was lack of cardiac activity in a fetus measuring 13w3d. By previous US imaging she should be 17w6d by LMP c/w 11w3d US.  She was planning on having D&C with her OB provider on . Also planned genetic testing and choromsome testing to determine sex of the fetus.    Patient reports that she started bleeding while in the bathtub and passed the fetus as well as the placenta. She briefly \"passed out\" but then came to. She then came to the ED. Her bleeding has slowed. Passed a few clots during US in ED. She feels cold but this is chronic. Denies recent illness, F/C, CP, SOB, N/V, abd pain (except with fundal pressure). She brought the fetus and placenta with her in tupperware container.     OBHx:   OB History    Para Term  AB Living   5 4 4 0 0 4   SAB TAB Ectopic Multiple Live Births   0 0 0 0 4      # Outcome Date GA Lbr Elmer/2nd Weight Sex Delivery Anes PTL Lv   5 Current            4 Term 17 38w5d  2.977 kg (6 lb 9 oz) F   N IHSAN      Name: Matti   3 Term 01/05/15 39w5d  3.29 kg (7 lb 4.1 oz) F    IHSAN      Name: Laurent      Apgar1: 8  Apgar5: 9   2 Term 10/11/05 39w0d 03:00 3.515 kg (7 lb 12 oz) M   N IHSAN      Name: Ed   1 Term 03 40w0d 14:00 3.827 kg (8 lb 7 oz) F   N IHSAN      Name: Guerda         PMH:   Past Medical History:   Diagnosis Date     Tobacco use        PSH:   Past Surgical History:   Procedure Laterality Date     COLONOSCOPY  ,      EXTRACTION(S) DENTAL       LUMPECTOMY BREAST Left    Migraines    Social Hx:   Social History     Tobacco Use     Smoking status: Light Tobacco Smoker     Smokeless tobacco: Never Used   Substance Use " Topics     Alcohol use: Not Currently     Drug use: Never        Family History: family history is not on file.  No family history of breast, ovarian or uterine cancer. No history of DVT.    ROS:   Negative except per HPI    Objective:   Vitals:    08/21/20 1705 08/21/20 2030 08/21/20 2108 08/21/20 2111   BP: 114/78 94/53 90/63    Pulse: 119 80 92 91   Resp: 18   16   Temp: 97.3  F (36.3  C)      TempSrc: Oral      SpO2:  100%  98%     Gen: Healthy appearing, sitting in bed, NAD, tearful at times  CV: Well-perfused  Resp: NWOB  GI: thin, soft, mild tenderness with fundal palpation  Pelvic Exam - : Blood visible on bilateral upper thighs, external genitalia normal well-estrogenized, healthy tissue.  No obvious excoriations, lesions, or rashes. Bartholins, urethra, skeins normal.  Normal pink vaginal mucosa.  SSE: Small dark red blood visible in vault, Cervix approx 1cm open without lesions, very minimal dark red blood passing through os on valsalva, cough, and with fundal pressure  Psychiatric: mentation appears normal and affect appropriate    Labs/Imaging:  Results for orders placed or performed during the hospital encounter of 08/21/20   US Pelvic Complete with Transvaginal     Status: None    Narrative    EXAMINATION: US PELVIC COMPLETE WITH TRANSVAGINAL, 8/21/2020 6:30 PM     COMPARISON: Ultrasound dated 8/19/2020.    HISTORY: eval for retained products    TECHNIQUE: The pelvis was scanned in standard fashion with  transabdominal and transvaginal transducer(s) using both grey scale  and limited color Doppler techniques.    FINDINGS:  The uterus measures 10.6 x 7.6 x 8.7 cm, and there is no evidence of a  focal fibroid.  The endometrium measures up to 16 mm in thickness. No  retained intrauterine products of conception. There is no free fluid  in the pelvis.    The right ovary measures 3.2 x 2.2 x 3.1 cm and the left ovary  measures 2.7 x 12.0 x 1.6 cm. Right ovarian cyst measuring 1.4 x 1.4 x  1.6 cm. There is  no adnexal mass. There is normal blood flow to the  ovaries.      Impression    IMPRESSION: No evidence for retained intrauterine products of  conception.    I have personally reviewed the examination and initial interpretation  and I agree with the findings.    NIKUNJ AUSTIN MD   CBC with platelets differential     Status: Abnormal   Result Value Ref Range    WBC 15.5 (H) 4.0 - 11.0 10e9/L    RBC Count 4.37 3.8 - 5.2 10e12/L    Hemoglobin 14.0 11.7 - 15.7 g/dL    Hematocrit 43.2 35.0 - 47.0 %    MCV 99 78 - 100 fl    MCH 32.0 26.5 - 33.0 pg    MCHC 32.4 31.5 - 36.5 g/dL    RDW 12.3 10.0 - 15.0 %    Platelet Count 253 150 - 450 10e9/L    Diff Method Automated Method     % Neutrophils 85.6 %    % Lymphocytes 8.4 %    % Monocytes 4.6 %    % Eosinophils 0.5 %    % Basophils 0.2 %    % Immature Granulocytes 0.7 %    Nucleated RBCs 0 0 /100    Absolute Neutrophil 13.2 (H) 1.6 - 8.3 10e9/L    Absolute Lymphocytes 1.3 0.8 - 5.3 10e9/L    Absolute Monocytes 0.7 0.0 - 1.3 10e9/L    Absolute Eosinophils 0.1 0.0 - 0.7 10e9/L    Absolute Basophils 0.0 0.0 - 0.2 10e9/L    Abs Immature Granulocytes 0.1 0 - 0.4 10e9/L    Absolute Nucleated RBC 0.0    INR     Status: None   Result Value Ref Range    INR 1.03 0.86 - 1.14   Partial thromboplastin time     Status: None   Result Value Ref Range    PTT 25 22 - 37 sec   Comprehensive metabolic panel     Status: None   Result Value Ref Range    Sodium 136 133 - 144 mmol/L    Potassium 3.5 3.4 - 5.3 mmol/L    Chloride 106 94 - 109 mmol/L    Carbon Dioxide 25 20 - 32 mmol/L    Anion Gap 5 3 - 14 mmol/L    Glucose 96 70 - 99 mg/dL    Urea Nitrogen 10 7 - 30 mg/dL    Creatinine 0.74 0.52 - 1.04 mg/dL    GFR Estimate >90 >60 mL/min/[1.73_m2]    GFR Estimate If Black >90 >60 mL/min/[1.73_m2]    Calcium 9.8 8.5 - 10.1 mg/dL    Bilirubin Total 0.4 0.2 - 1.3 mg/dL    Albumin 3.4 3.4 - 5.0 g/dL    Protein Total 7.6 6.8 - 8.8 g/dL    Alkaline Phosphatase 52 40 - 150 U/L    ALT 21 0 - 50 U/L     AST 16 0 - 45 U/L   Lipase     Status: None   Result Value Ref Range    Lipase 119 73 - 393 U/L   Lactic acid whole blood     Status: None   Result Value Ref Range    Lactic Acid 1.8 0.7 - 2.0 mmol/L   UA reflex to Microscopic and Culture     Status: Abnormal    Specimen: Urine clean catch; Midstream Urine   Result Value Ref Range    Color Urine Orange     Appearance Urine Slightly Cloudy     Glucose Urine Negative NEG^Negative mg/dL    Bilirubin Urine Negative NEG^Negative    Ketones Urine 5 (A) NEG^Negative mg/dL    Specific Gravity Urine 1.023 1.003 - 1.035    Blood Urine Large (A) NEG^Negative    pH Urine 7.0 5.0 - 7.0 pH    Protein Albumin Urine 100 (A) NEG^Negative mg/dL    Urobilinogen mg/dL 2.0 0.0 - 2.0 mg/dL    Nitrite Urine Negative NEG^Negative    Leukocyte Esterase Urine Small (A) NEG^Negative    Source Midstream Urine     RBC Urine >182 (H) 0 - 2 /HPF    WBC Urine 5 0 - 5 /HPF    Squamous Epithelial /HPF Urine 1 0 - 1 /HPF    Mucous Urine Present (A) NEG^Negative /LPF   HCG quantitative pregnancy     Status: Abnormal   Result Value Ref Range    HCG Quantitative Serum 703 (H) 0 - 5 IU/L   EKG 12-lead, tracing only     Status: None (Preliminary result)   Result Value Ref Range    Interpretation ECG Click View Image link to view waveform and result    ABO/Rh type and screen     Status: None   Result Value Ref Range    ABO A     RH(D) Pos     Antibody Screen Neg     Test Valid Only At          [facility]    Specimen Expires 08/24/2020       Pelvic US:  TECHNIQUE: The pelvis was scanned in standard fashion with  transabdominal and transvaginal transducer(s) using both grey scale  and limited color Doppler techniques.     FINDINGS:  The uterus measures 10.6 x 7.6 x 8.7 cm, and there is no evidence of a  focal fibroid.  The endometrium measures up to 16 mm in thickness. No  retained intrauterine products of conception. There is no free fluid  in the  pelvis.     The right ovary measures 3.2 x 2.2 x 3.1 cm and the left ovary  measures 2.7 x 12.0 x 1.6 cm. Right ovarian cyst measuring 1.4 x 1.4 x  1.6 cm. There is no adnexal mass. There is normal blood flow to the  ovaries.                                                                   IMPRESSION: No evidence for retained intrauterine products of  conception.    Assessment/Plan:   Sultana Greene is a 40 year old  with likely complete  with some residual bleeding. Patient presented after passing significant amount of tissue at home. Found to be tachycardic to 120s which improved with IVF hydration. Hg was stable at 14.0. On exam bleeding was minimal. US imaging showed no evidence of retained POC. Discussed options including expectant management and medical management with misoprostol. Suction procedure not indicated given no evidence of POC.     - 400mcg buccal misoprostol  - Tylenol and ibuprofen for pain control PRN  - Discussed expectations for cramping and bleeding  - Discussed return precautions including temperature of 100.4 or greater, uncontrolled pain, uncontrolled nausea and vomiting, vaginal bleeding that exceeds soaking 1 pad an hour for 2 hours, or purulent vaginal discharge  - Fetus and placenta sent for pathology  - Consent for collection of genetic information signed  - Rh positive, Rhogam not indicated    Staffed with Dr. Whitehead.    Sahil Ramirez MD MPH  OB/Gyn PGY-2  20 8:41 PM        ATTESTATION  I agree with above documentation including his assessment and plan which I reviewed with Dr. Ramirez over the phone. I reviewed patient's imaging and laboratory evaluation. I did not see patient in person because I was assigned in house ob call duty on [facility].     Millie Whitehead MD

## 2020-08-21 NOTE — ED AVS SNAPSHOT
[facility], [place], Emergency Department  [address] [place] [address]  Phone:  [phone]                                    Sultana Greene   MRN: [id number]    Department:  [facility], Emergency Department   Date of Visit:  8/21/2020           After Visit Summary Signature Page    I have received my discharge instructions, and my questions have been answered. I have discussed any challenges I see with this plan with the nurse or doctor.    ..........................................................................................................................................  Patient/Patient Representative Signature      ..........................................................................................................................................  Patient Representative Print Name and Relationship to Patient    ..................................................               ................................................  Date                                   Time    ..........................................................................................................................................  Reviewed by Signature/Title    ...................................................              ..............................................  Date                                               Time          22EPIC Rev 08/18

## 2020-08-21 NOTE — TELEPHONE ENCOUNTER
Sultana is a 40 year old   who was seen yesterday in clinic for a follow up of early miscarriage diagnosed 2020. She is 16 weeks by dates, but 13 weeks by ultrasound. Patient is Rh +    Patient's spouse Adam called triage line. Patient aware, she was in bathtub. Patient passed fetus in bathtub and they have placed it in a Tupperware container and don't know what to do with it.     RN advised that when patient gets out of tub she needs to place maxi pad in underwear and monitor bleeding as well as symptoms of dizziness, lightheadedness, fatigue, weakness, headache, etc.     Patient is scheduled for preop on Monday and wants to know if appointment needs to be kept and if she should still have surgery.     RN will route to provider. Please call partner, Adam's cell, [phone] with recommendations.     Dina De La Torre RN on 2020 at 3:39 PM

## 2020-08-22 DIAGNOSIS — Z53.9 NO SHOW: Primary | ICD-10-CM

## 2020-08-22 DIAGNOSIS — Z01.818 PRE-OP EXAM: ICD-10-CM

## 2020-08-22 NOTE — PROGRESS NOTES
This patient was a no show for this scheduled appointment. Ethel Argueta, MAICOL on 8/22/2020 at 3:24 PM

## 2020-08-22 NOTE — ED NOTES
Miscarried contents were collected from ER by lucho Mendez. Contents were in a chinese takeout box wrapped in a clean brown paper bag. Lab tech brought contents and genetic testing consent form to the lab for collection and testing.

## 2020-08-22 NOTE — DISCHARGE INSTRUCTIONS
TODAY'S VISIT:  You were seen today for miscarriage  -   - If you had any labs or imaging/radiology tests performed today, you should also discuss these tests with your usual provider.     FOLLOW-UP:  Please make an appointment to follow up with:  - Your Primary Care Provider. If you do not have a PCP, please call the Primary Care Center (phone: [phone] for an appointment  - OB/Gyn - [facility] (phone: [phone])     - Have your provider review the results from today's visit with you again to make sure no further follow-up or additional testing is needed based on those results.     RETURN TO THE EMERGENCY DEPARTMENT  Return to the Emergency Department at any time for any new or worsening symptoms or any concerns.

## 2020-08-24 LAB — INTERPRETATION ECG - MUSE: NORMAL

## 2020-08-26 ENCOUNTER — TELEPHONE (OUTPATIENT)
Dept: OBGYN | Facility: CLINIC | Age: 40
End: 2020-08-26

## 2020-08-26 LAB — COPATH REPORT: NORMAL

## 2020-08-26 NOTE — TELEPHONE ENCOUNTER
Reason for call:  Other   Patient called regarding (reason for call): call back  Additional comments: Patient is calling because she said someone called her about a pathologist report, please call back     Phone number to reach patient:  Home number on file [phone] (home)    Best Time:  any    Can we leave a detailed message on this number?  YES    Travel screening: Not Applicable

## 2020-08-27 NOTE — TELEPHONE ENCOUNTER
Telephone call to patient and discussed result. Aware chromosome results are still not back. Patient processing everything still at this time. Bleeding much lighter now. Encouraged to call back with any additional questions/concerns. Discussed follow up as well. Maryjo BARRETO CNP

## 2020-09-08 ENCOUNTER — APPOINTMENT (OUTPATIENT)
Dept: ULTRASOUND IMAGING | Facility: CLINIC | Age: 40
End: 2020-09-08
Attending: EMERGENCY MEDICINE
Payer: COMMERCIAL

## 2020-09-08 ENCOUNTER — APPOINTMENT (OUTPATIENT)
Dept: CT IMAGING | Facility: CLINIC | Age: 40
End: 2020-09-08
Attending: EMERGENCY MEDICINE
Payer: COMMERCIAL

## 2020-09-08 ENCOUNTER — HOSPITAL ENCOUNTER (EMERGENCY)
Facility: CLINIC | Age: 40
Discharge: HOME | End: 2020-09-08
Attending: EMERGENCY MEDICINE | Admitting: EMERGENCY MEDICINE
Payer: COMMERCIAL

## 2020-09-08 VITALS
OXYGEN SATURATION: 100 % | HEIGHT: 68 IN | TEMPERATURE: 97.5 F | SYSTOLIC BLOOD PRESSURE: 101 MMHG | WEIGHT: 120 LBS | HEART RATE: 63 BPM | DIASTOLIC BLOOD PRESSURE: 65 MMHG | BODY MASS INDEX: 18.19 KG/M2 | RESPIRATION RATE: 16 BRPM

## 2020-09-08 DIAGNOSIS — N83.202 LEFT OVARIAN CYST: ICD-10-CM

## 2020-09-08 LAB
ALBUMIN SERPL-MCNC: 3.6 G/DL (ref 3.4–5)
ALBUMIN UR-MCNC: 10 MG/DL
ALP SERPL-CCNC: 50 U/L (ref 40–150)
ALT SERPL W P-5'-P-CCNC: 25 U/L (ref 0–50)
ANION GAP SERPL CALCULATED.3IONS-SCNC: 5 MMOL/L (ref 3–14)
APPEARANCE UR: CLEAR
AST SERPL W P-5'-P-CCNC: 19 U/L (ref 0–45)
B-HCG SERPL-ACNC: 6 IU/L (ref 0–5)
BACTERIA #/AREA URNS HPF: ABNORMAL /HPF
BASOPHILS # BLD AUTO: 0 10E9/L (ref 0–0.2)
BASOPHILS NFR BLD AUTO: 0.2 %
BILIRUB SERPL-MCNC: 0.2 MG/DL (ref 0.2–1.3)
BILIRUB UR QL STRIP: NEGATIVE
BUN SERPL-MCNC: 16 MG/DL (ref 7–30)
CALCIUM SERPL-MCNC: 8.8 MG/DL (ref 8.5–10.1)
CHLORIDE SERPL-SCNC: 111 MMOL/L (ref 94–109)
CO2 SERPL-SCNC: 25 MMOL/L (ref 20–32)
COLOR UR AUTO: YELLOW
CREAT SERPL-MCNC: 0.91 MG/DL (ref 0.52–1.04)
DIFFERENTIAL METHOD BLD: ABNORMAL
EOSINOPHIL # BLD AUTO: 0.1 10E9/L (ref 0–0.7)
EOSINOPHIL NFR BLD AUTO: 1.1 %
ERYTHROCYTE [DISTWIDTH] IN BLOOD BY AUTOMATED COUNT: 12.9 % (ref 10–15)
GFR SERPL CREATININE-BSD FRML MDRD: 79 ML/MIN/{1.73_M2}
GLUCOSE SERPL-MCNC: 87 MG/DL (ref 70–99)
GLUCOSE UR STRIP-MCNC: NEGATIVE MG/DL
HCT VFR BLD AUTO: 34.6 % (ref 35–47)
HGB BLD-MCNC: 11.3 G/DL (ref 11.7–15.7)
HGB UR QL STRIP: ABNORMAL
IMM GRANULOCYTES # BLD: 0 10E9/L (ref 0–0.4)
IMM GRANULOCYTES NFR BLD: 0.4 %
KETONES UR STRIP-MCNC: NEGATIVE MG/DL
LEUKOCYTE ESTERASE UR QL STRIP: NEGATIVE
LIPASE SERPL-CCNC: 172 U/L (ref 73–393)
LYMPHOCYTES # BLD AUTO: 1.5 10E9/L (ref 0.8–5.3)
LYMPHOCYTES NFR BLD AUTO: 33.1 %
MCH RBC QN AUTO: 32.2 PG (ref 26.5–33)
MCHC RBC AUTO-ENTMCNC: 32.7 G/DL (ref 31.5–36.5)
MCV RBC AUTO: 99 FL (ref 78–100)
MONOCYTES # BLD AUTO: 0.3 10E9/L (ref 0–1.3)
MONOCYTES NFR BLD AUTO: 7.4 %
MUCOUS THREADS #/AREA URNS LPF: PRESENT /LPF
NEUTROPHILS # BLD AUTO: 2.7 10E9/L (ref 1.6–8.3)
NEUTROPHILS NFR BLD AUTO: 57.8 %
NITRATE UR QL: NEGATIVE
NRBC # BLD AUTO: 0 10*3/UL
NRBC BLD AUTO-RTO: 0 /100
PH UR STRIP: 5 PH (ref 5–7)
PLATELET # BLD AUTO: 202 10E9/L (ref 150–450)
POTASSIUM SERPL-SCNC: 4.4 MMOL/L (ref 3.4–5.3)
PROT SERPL-MCNC: 7.2 G/DL (ref 6.8–8.8)
RBC # BLD AUTO: 3.51 10E12/L (ref 3.8–5.2)
RBC #/AREA URNS AUTO: 2 /HPF (ref 0–2)
SODIUM SERPL-SCNC: 140 MMOL/L (ref 133–144)
SOURCE: ABNORMAL
SP GR UR STRIP: 1.03 (ref 1–1.03)
SQUAMOUS #/AREA URNS AUTO: 10 /HPF (ref 0–1)
UROBILINOGEN UR STRIP-MCNC: NORMAL MG/DL (ref 0–2)
WBC # BLD AUTO: 4.6 10E9/L (ref 4–11)
WBC #/AREA URNS AUTO: <1 /HPF (ref 0–5)

## 2020-09-08 PROCEDURE — 74177 CT ABD & PELVIS W/CONTRAST: CPT

## 2020-09-08 PROCEDURE — 83690 ASSAY OF LIPASE: CPT | Performed by: EMERGENCY MEDICINE

## 2020-09-08 PROCEDURE — 80053 COMPREHEN METABOLIC PANEL: CPT | Performed by: EMERGENCY MEDICINE

## 2020-09-08 PROCEDURE — 87491 CHLMYD TRACH DNA AMP PROBE: CPT | Performed by: EMERGENCY MEDICINE

## 2020-09-08 PROCEDURE — 84702 CHORIONIC GONADOTROPIN TEST: CPT | Performed by: EMERGENCY MEDICINE

## 2020-09-08 PROCEDURE — 87591 N.GONORRHOEAE DNA AMP PROB: CPT | Performed by: EMERGENCY MEDICINE

## 2020-09-08 PROCEDURE — 93976 VASCULAR STUDY: CPT

## 2020-09-08 PROCEDURE — 25000128 H RX IP 250 OP 636: Performed by: STUDENT IN AN ORGANIZED HEALTH CARE EDUCATION/TRAINING PROGRAM

## 2020-09-08 PROCEDURE — 99285 EMERGENCY DEPT VISIT HI MDM: CPT | Mod: Z6 | Performed by: EMERGENCY MEDICINE

## 2020-09-08 PROCEDURE — 99285 EMERGENCY DEPT VISIT HI MDM: CPT | Mod: 25 | Performed by: EMERGENCY MEDICINE

## 2020-09-08 PROCEDURE — 81001 URINALYSIS AUTO W/SCOPE: CPT | Performed by: EMERGENCY MEDICINE

## 2020-09-08 PROCEDURE — 85025 COMPLETE CBC W/AUTO DIFF WBC: CPT | Performed by: EMERGENCY MEDICINE

## 2020-09-08 RX ORDER — IOPAMIDOL 755 MG/ML
73 INJECTION, SOLUTION INTRAVASCULAR ONCE
Status: COMPLETED | OUTPATIENT
Start: 2020-09-08 | End: 2020-09-08

## 2020-09-08 RX ADMIN — IOPAMIDOL 73 ML: 755 INJECTION, SOLUTION INTRAVENOUS at 11:55

## 2020-09-08 ASSESSMENT — ENCOUNTER SYMPTOMS
ARTHRALGIAS: 0
CONFUSION: 0
EYE REDNESS: 0
NAUSEA: 0
HEADACHES: 0
DIFFICULTY URINATING: 0
NECK STIFFNESS: 0
FLANK PAIN: 0
HEMATURIA: 0
CHILLS: 0
VOMITING: 0
FEVER: 0
COLOR CHANGE: 0
FREQUENCY: 0
SHORTNESS OF BREATH: 0
DYSURIA: 0
DIARRHEA: 0
CONSTIPATION: 0
ABDOMINAL PAIN: 1
COUGH: 0

## 2020-09-08 ASSESSMENT — MIFFLIN-ST. JEOR: SCORE: 1262.82

## 2020-09-08 NOTE — ED TRIAGE NOTES
Triage Assessment & Note:    LMP 04/29/2020 (Within Days)     Patient presents with: C/O LLQ abdominal pain, shooting type pain. PT reports a recent miscarriage. PT denies N/V, Last BM 9/7     Home Treatments/Remedies: None  Febrile / Afebrile? Afebrile     Duration of C/o:  1.5 days     Cedric Garcia RN  September 8, 2020

## 2020-09-08 NOTE — ED PROVIDER NOTES
ED Provider Note  [facility]      History     Chief Complaint   Patient presents with     Abdominal Pain     The history is provided by the patient and medical records.     Sultana Greene is a 40 year old female with a past medical history significant for recent miscarriage (8/21) who presents here to the Emergency Department due to left lower quadrant abdominal pain.  Patient reports shooting pain in her left lower quadrant.  She describes the pain as sharp.  States the pain started yesterday as a dull ache, but notes this morning it became sharp.  She denies radiation of the pain.  Patient denies back pain.  She denies nausea or vomiting.  Patient notes she is still bleeding slightly from her recent miscarriage, but notes it is barely any blood.  She states her last bowel movement was on 9/7 and was normal.  Patient denies urinary symptoms.  Patient denies history of abdominal surgery.  She denies respiratory symptoms, fever or chills.  Patient denies history of ovarian cysts.     US Pelvic with Transvaginal 8/21/20  IMPRESSION: No evidence for retained intrauterine products of  conception.    Past Medical History  Past Medical History:   Diagnosis Date     Tobacco use      Past Surgical History:   Procedure Laterality Date     COLONOSCOPY  2003, 2014     EXTRACTION(S) DENTAL       LUMPECTOMY BREAST Left 2002     Prenatal Vit-Fe Fumarate-FA (PRENATAL MULTIVITAMIN W/IRON) 27-0.8 MG tablet      No Known Allergies  Family History  No family history on file.  Social History   Social History     Tobacco Use     Smoking status: Light Tobacco Smoker     Smokeless tobacco: Never Used   Substance Use Topics     Alcohol use: Not Currently     Drug use: Never      Past medical history, past surgical history, medications, allergies, family history, and social history were reviewed with the patient. No additional pertinent items.       Review of Systems   Constitutional: Negative for chills  "and fever.   HENT: Negative for congestion.    Eyes: Negative for redness.   Respiratory: Negative for cough and shortness of breath.    Cardiovascular: Negative for chest pain.   Gastrointestinal: Positive for abdominal pain (left lower quadrant). Negative for constipation, diarrhea, nausea and vomiting.   Genitourinary: Positive for vaginal bleeding (light pink). Negative for difficulty urinating, dysuria, flank pain, frequency, hematuria and urgency.   Musculoskeletal: Negative for arthralgias and neck stiffness.   Skin: Negative for color change.   Neurological: Negative for headaches.   Psychiatric/Behavioral: Negative for confusion.   All other systems reviewed and are negative.    A complete review of systems was performed with pertinent positives and negatives noted in the HPI, and all other systems negative.    Physical Exam   BP: 102/51  Pulse: 73  Temp: 97.6  F (36.4  C)  Resp: 18  Height: 172.7 cm (5' 8\")  Weight: 54.4 kg (120 lb)  SpO2: 98 %  Physical Exam  Vitals signs and nursing note reviewed. Exam conducted with a chaperone present.   Constitutional:       General: She is in acute distress.      Appearance: She is not diaphoretic.   HENT:      Head: Atraumatic.      Mouth/Throat:      Pharynx: No oropharyngeal exudate.   Eyes:      General: No scleral icterus.     Pupils: Pupils are equal, round, and reactive to light.   Cardiovascular:      Rate and Rhythm: Normal rate and regular rhythm.   Pulmonary:      Effort: Pulmonary effort is normal. No respiratory distress.   Abdominal:      Palpations: Abdomen is soft.      Tenderness: There is abdominal tenderness in the left lower quadrant. There is no guarding or rebound.   Genitourinary:     Vagina: Normal.      Cervix: Normal.      Uterus: Normal.       Adnexa: Right adnexa normal.        Left: Tenderness present.    Musculoskeletal:         General: No tenderness.   Skin:     General: Skin is warm and dry.      Findings: No rash.   Neurological:     "  General: No focal deficit present.      Mental Status: She is alert.      Motor: No weakness.           ED Course     9:30 AM  The patient was seen and examined by Basim Norman MD in Room ED07.    Procedures           Results for orders placed or performed during the hospital encounter of 09/08/20   US Pelvic Complete w Transvaginal & Abd/Pel Duplex Limited     Status: None    Narrative    EXAMINATION: US PELVIS COMPLETE W TRANSVAGINAL AND DOPPLER LIMITED,  9/8/2020 10:53 AM     COMPARISON: Pelvic ultrasound 8/21/2020.    HISTORY: Left adnexal pain???evaluate for torsion    TECHNIQUE: The pelvis was scanned in standard fashion with  transabdominal and transvaginal transducer(s) using both grey scale  and limited color Doppler techniques.    FINDINGS:  The uterus is retroverted, measures 9 x 5.7 x 8.1 cm, and there is no  evidence of a focal fibroid.  The endometrium measures 16 mm. There is  a focal echogenic area measuring 0.6 cm in the lower uterine body  endometrium suggestive of polyp. There is no free fluid in the pelvis.    The right ovary measures 2.9 x 1.5 x 1.5 cm and is normal. The left  ovary measures 2.9 x 2.4 x 3.3 and demonstrates a dominant follicle  measuring 2.2 cm.  There is normal blood flow to the ovaries. There is  no adnexal mass.      Impression    IMPRESSION:   1.  No sonographic evidence for ovarian torsion.  2.  Small endometrial polyp measuring 0.6 cm.    ERICA KRISHNAN MD   CT Abdomen Pelvis w Contrast     Status: None    Narrative    CT of the Abdomen and Pelvis with contrast, 9/8/2020 12:03 PM.    Comparison: None.    History: LLQ pain.     Technique: Axial images of the  abdomen and pelvis were obtained with  contrast. Coronal reconstructions were provided. Images were reviewed  in bone, lung, and soft tissue windows.     Total DLP: 623 mGy*cm.    Contrast: iopamidol (ISOVUE-370) solution 73 mL    Findings:    Chest: Small hiatal hernia. No suspicious lung nodules. No evidence  of  lung infection. No pleural effusion. Heart size within normal limits..       Abdomen and Pelvis: There are no suspicious hepatic lesions. No opaque  gallbladder calculi. No intrahepatic or extrahepatic biliary  dilatation. Pancreas unremarkable. Spleen size within normal limits.  No suspicious adrenal mass lesions. Symmetric nephrographic renal  phase. No evidence of hydronephrosis. Visualized ureters and urinary  bladder is unremarkable.  Fluid attenuating left ovarian cyst  measuring 2.3 cm, likely corpus luteal cyst. No diverticulitis. No  evidence of bowel obstruction. No free fluid. Appendix unremarkable.  Abdominal vasculature unremarkable. No suspicious or enlarged  mesenteric, retroperitoneal and pelvic lymph nodes.     Bones and Soft Tissues: No suspicious osseous lesion. No suspicious  mass.         Impression    Impression:    1) 2.3 cm left ovarian follicle.  2.  Small hiatal hernia.    I have personally reviewed the examination and initial interpretation  and I agree with the findings.    NIKUNJ AUSTIN MD   CBC with platelets differential     Status: Abnormal   Result Value Ref Range    WBC 4.6 4.0 - 11.0 10e9/L    RBC Count 3.51 (L) 3.8 - 5.2 10e12/L    Hemoglobin 11.3 (L) 11.7 - 15.7 g/dL    Hematocrit 34.6 (L) 35.0 - 47.0 %    MCV 99 78 - 100 fl    MCH 32.2 26.5 - 33.0 pg    MCHC 32.7 31.5 - 36.5 g/dL    RDW 12.9 10.0 - 15.0 %    Platelet Count 202 150 - 450 10e9/L    Diff Method Automated Method     % Neutrophils 57.8 %    % Lymphocytes 33.1 %    % Monocytes 7.4 %    % Eosinophils 1.1 %    % Basophils 0.2 %    % Immature Granulocytes 0.4 %    Nucleated RBCs 0 0 /100    Absolute Neutrophil 2.7 1.6 - 8.3 10e9/L    Absolute Lymphocytes 1.5 0.8 - 5.3 10e9/L    Absolute Monocytes 0.3 0.0 - 1.3 10e9/L    Absolute Eosinophils 0.1 0.0 - 0.7 10e9/L    Absolute Basophils 0.0 0.0 - 0.2 10e9/L    Abs Immature Granulocytes 0.0 0 - 0.4 10e9/L    Absolute Nucleated RBC 0.0    Comprehensive metabolic  panel     Status: Abnormal   Result Value Ref Range    Sodium 140 133 - 144 mmol/L    Potassium 4.4 3.4 - 5.3 mmol/L    Chloride 111 (H) 94 - 109 mmol/L    Carbon Dioxide 25 20 - 32 mmol/L    Anion Gap 5 3 - 14 mmol/L    Glucose 87 70 - 99 mg/dL    Urea Nitrogen 16 7 - 30 mg/dL    Creatinine 0.91 0.52 - 1.04 mg/dL    GFR Estimate 79 >60 mL/min/[1.73_m2]    GFR Estimate If Black >90 >60 mL/min/[1.73_m2]    Calcium 8.8 8.5 - 10.1 mg/dL    Bilirubin Total 0.2 0.2 - 1.3 mg/dL    Albumin 3.6 3.4 - 5.0 g/dL    Protein Total 7.2 6.8 - 8.8 g/dL    Alkaline Phosphatase 50 40 - 150 U/L    ALT 25 0 - 50 U/L    AST 19 0 - 45 U/L   UA with Microscopic     Status: Abnormal   Result Value Ref Range    Color Urine Yellow     Appearance Urine Clear     Glucose Urine Negative NEG^Negative mg/dL    Bilirubin Urine Negative NEG^Negative    Ketones Urine Negative NEG^Negative mg/dL    Specific Gravity Urine 1.029 1.003 - 1.035    Blood Urine Moderate (A) NEG^Negative    pH Urine 5.0 5.0 - 7.0 pH    Protein Albumin Urine 10 (A) NEG^Negative mg/dL    Urobilinogen mg/dL Normal 0.0 - 2.0 mg/dL    Nitrite Urine Negative NEG^Negative    Leukocyte Esterase Urine Negative NEG^Negative    Source Unspecified Urine     WBC Urine <1 0 - 5 /HPF    RBC Urine 2 0 - 2 /HPF    Bacteria Urine Few (A) NEG^Negative /HPF    Squamous Epithelial /HPF Urine 10 (H) 0 - 1 /HPF    Mucous Urine Present (A) NEG^Negative /LPF   HCG quantitative pregnancy     Status: Abnormal   Result Value Ref Range    HCG Quantitative Serum 6 (H) 0 - 5 IU/L   Lipase     Status: None   Result Value Ref Range    Lipase 172 73 - 393 U/L     Medications   iopamidol (ISOVUE-370) solution 73 mL (73 mLs Intravenous Given 20 1155)   sodium chloride (PF) 0.9% PF flush 68 mL (68 mLs Intravenous Given 20 1155)        Assessments & Plan (with Medical Decision Making)   40 year old female with recent (2-1/2 weeks ago) spontaneous  to the emergency department with left lower  abdominal pain.  She did have some adnexal tenderness on her physical examination.  Her quantitative beta-hCG has down trended to an almost normal level consistent with completed spontaneous  (no retained products on ultrasound ).  Pelvic ultrasound reveals normal ovarian blood flow.  She does have a dominant follicle on the left ovary.  No other abnormality identified.  Labs and urinalysis are normal/baseline.  CT of abdomen/pelvis reveals left adnexal fluid attenuating cyst and no other acute pathology.  The patient declined pain medicines here in the emergency department.  No other etiology present.  Suspect ovarian cyst as a cause of her symptoms.  Over-the-counter analgesics including ibuprofen and acetaminophen recommended.  Gynecology follow-up recommended for 1 to 2 weeks.  Return precautions provided including specifically worsened pain, vomiting, or other concerns.    I have reviewed the nursing notes. I have reviewed the findings, diagnosis, plan and need for follow up with the patient.    Discharge Medication List as of 2020 12:54 PM          Final diagnoses:   Left ovarian cyst   IThelma, am serving as a trained medical scribe to document services personally performed by Basim Norman Md, MD, based on the provider's statements to me.     IBasim Md, MD, was physically present and have reviewed and verified the accuracy of this note documented by Thelma Palmer.     Chart documentation was completed with Dragon voice-recognition software. Even though reviewed, this chart may still contain some grammatical, spelling, and word errors.     --  Basim Norman MD  [facility], [facility] EMERGENCY DEPARTMENT  2020     Basim Norman MD  [phone]

## 2020-09-08 NOTE — DISCHARGE INSTRUCTIONS
Take ibuprofen or acetaminophen as needed for pain.    Follow-up with your gynecologist in 1 to 2 weeks.    Return to the emergency department for worsening symptoms, vomiting, or other concerns.

## 2020-09-08 NOTE — ED AVS SNAPSHOT
[facility], [place], Emergency Department  [address] [place] [id number]  Phone:  [phone]                                    Sultana Greene   MRN: [id number]    Department:  [facility], Emergency Department   Date of Visit:  9/8/2020           After Visit Summary Signature Page    I have received my discharge instructions, and my questions have been answered. I have discussed any challenges I see with this plan with the nurse or doctor.    ..........................................................................................................................................  Patient/Patient Representative Signature      ..........................................................................................................................................  Patient Representative Print Name and Relationship to Patient    ..................................................               ................................................  Date                                   Time    ..........................................................................................................................................  Reviewed by Signature/Title    ...................................................              ..............................................  Date                                               Time          22EPIC Rev 08/18

## 2020-09-09 ENCOUNTER — TELEPHONE (OUTPATIENT)
Dept: OBGYN | Facility: CLINIC | Age: 40
End: 2020-09-09

## 2020-09-09 LAB
C TRACH DNA SPEC QL NAA+PROBE: NEGATIVE
N GONORRHOEA DNA SPEC QL NAA+PROBE: NEGATIVE
SPECIMEN SOURCE: NORMAL
SPECIMEN SOURCE: NORMAL

## 2020-09-09 NOTE — TELEPHONE ENCOUNTER
RN called and notified patient that this was still in process. If not resulted by end of week, RN will call lab to check in.     Patient verbalized understanding and agreed to plan.     Dina De La Torre RN on 9/9/2020 at 3:13 PM

## 2020-09-09 NOTE — TELEPHONE ENCOUNTER
Chromosome analysis still appears to be in process. The surgical pathology has been discussed already. Maryjo BARRETO CNP

## 2020-09-09 NOTE — TELEPHONE ENCOUNTER
Please see result notes from Surgical Pathology on 8/21/2020. Multiple attempts to reach patient. RN will route to [facility].     Dina De La Torre RN on 9/9/2020 at 12:50 PM

## 2020-09-09 NOTE — TELEPHONE ENCOUNTER
Patient would like to know if pathology report has been completed. Please call to discuss. Jaymie Agrawal TC/Pt Rep

## 2020-09-09 NOTE — RESULT ENCOUNTER NOTE
Final result for both N. Gonorrhoeae PCR and Chlamydia Trachomatis PCR are NEGATIVE.  No treatment or change in treatment per [place] ED Lab Result protocol.

## 2020-09-11 NOTE — PROGRESS NOTES
"Subjective     Sultana Greene is a 40 year old female who presents to clinic today for the following health issues:    HPI     ED Followup:    Facility:  [facility]  Date of visit: 2020  Reason for visit: Abdominal pain  Current Status: Improving     Patient had a missed AB in August, was scheduled for a D&C and then did miscarry prior to her surgery. Seen in the ED 2020 with sudden onset of LLQ. Work up done showed her Quant HCG was 6. Imaging showed a 2.2 cm dominant left ovarian follicle. No evidence of torsion. Pain has slowly been improving. Knows she does not take it as easy as she should in general. Denies fever, nausea, abnormal vaginal or urinary symptoms. Occasionally will get a sharper pain that lasts a few seconds, goes down her leg and is gone. When needed, heat and epsom salt baths help. Has not really felt the need for NSAIDs. No bowel changes.     Review of Systems   Constitutional, HEENT, cardiovascular, pulmonary, gi and gu systems are negative, except as otherwise noted.      Objective    /71 (BP Location: Right arm, Patient Position: Sitting, Cuff Size: Adult Regular)   Pulse 95   Temp 97.7  F (36.5  C) (Tympanic)   Ht 1.715 m (5' 7.5\")   Wt 55.2 kg (121 lb 9.6 oz)   LMP 2020 (Within Days)   SpO2 99%   BMI 18.76 kg/m    Body mass index is 18.76 kg/m .  Physical Exam   GENERAL: healthy, alert and no distress  RESP: lungs clear to auscultation - no rales, rhonchi or wheezes  CV: regular rate and rhythm, normal S1 S2, no S3 or S4, no murmur, click or rub, no peripheral edema and peripheral pulses strong  ABDOMEN: soft, without hepatosplenomegaly or masses. Mild tenderness LLQ and bowel sounds normal   (female): Patient declined  MS: no gross musculoskeletal defects noted, no edema  SKIN: no suspicious lesions or rashes  PSYCH: mentation appears normal, affect normal/bright    Assessment & Plan     Missed   Last HCG was 6. Will check one last one and if under 5, " no further follow up needed.  - HCG quantitative pregnancy    Pelvic pain in female  We reviewed her symptoms, ED notes and testing. We discussed the ultrasound and CT findings. Discussed ovarian cysts. As pain slowly improving, will monitor now. If pain persists beyond few weeks or after her next cycle-then plan follow up pelvic ultrasound. Warning signs to monitor for and report immediately discussed with patient and she verbalizes understanding. Discussed use of NSAIDs in addition to heat and baths if needed.  - US Pelvic Complete with Transvaginal; Future     ESTEVAN Wang CNP  [facility]

## 2020-09-15 ENCOUNTER — OFFICE VISIT (OUTPATIENT)
Dept: OBGYN | Facility: CLINIC | Age: 40
End: 2020-09-15
Payer: COMMERCIAL

## 2020-09-15 VITALS
WEIGHT: 121.6 LBS | OXYGEN SATURATION: 99 % | HEIGHT: 68 IN | BODY MASS INDEX: 18.43 KG/M2 | SYSTOLIC BLOOD PRESSURE: 113 MMHG | DIASTOLIC BLOOD PRESSURE: 71 MMHG | TEMPERATURE: 97.7 F | HEART RATE: 95 BPM

## 2020-09-15 DIAGNOSIS — O02.1 MISSED ABORTION: Primary | ICD-10-CM

## 2020-09-15 DIAGNOSIS — R10.2 PELVIC PAIN IN FEMALE: ICD-10-CM

## 2020-09-15 LAB — B-HCG SERPL-ACNC: 3 IU/L (ref 0–5)

## 2020-09-15 PROCEDURE — 84702 CHORIONIC GONADOTROPIN TEST: CPT | Performed by: NURSE PRACTITIONER

## 2020-09-15 PROCEDURE — 36415 COLL VENOUS BLD VENIPUNCTURE: CPT | Performed by: NURSE PRACTITIONER

## 2020-09-15 PROCEDURE — 99213 OFFICE O/P EST LOW 20 MIN: CPT | Performed by: NURSE PRACTITIONER

## 2020-09-15 ASSESSMENT — PAIN SCALES - GENERAL: PAINLEVEL: SEVERE PAIN (6)

## 2020-09-15 ASSESSMENT — MIFFLIN-ST. JEOR: SCORE: 1262.13

## 2020-09-16 ENCOUNTER — TELEPHONE (OUTPATIENT)
Dept: OBGYN | Facility: CLINIC | Age: 40
End: 2020-09-16

## 2020-09-16 NOTE — TELEPHONE ENCOUNTER
[facility] Call Center    Phone Message    May a detailed message be left on voicemail: yes     Reason for Call: Other: Pt returned phone call about results. Please advise.      Action Taken: Message routed to:  [facility] p [phone]    Travel Screening: Not Applicable

## 2020-09-24 ENCOUNTER — TELEPHONE (OUTPATIENT)
Dept: OBGYN | Facility: CLINIC | Age: 40
End: 2020-09-24

## 2020-09-24 NOTE — TELEPHONE ENCOUNTER
Reason for call:  Form   Our goal is to have forms completed within 72 hours, however some forms may require a visit or additional information.     Who is the form from? Patient  Where did the form come from? Patient or family brought in     What clinic location was the form placed at? [place]  Where was the form placed? [facility] Box/Folder  What number is listed as a contact on the form? [phone]    Phone call message - patient request for a letter, form or note:     Date needed: as soon as possible  Please fax to [phone]  Has the patient signed a consent form for release of information?     Additional comments:     Type of letter, form or note: [facility]    Phone number to reach patient:  Home number on file [phone] (home)    Best Time:      Can we leave a detailed message on this number?  YES    Travel screening: Negative

## 2020-09-28 NOTE — TELEPHONE ENCOUNTER
Patient returned call to clinic. She states the mental state she is referring to is with her recent miscarriage.     RN will route back to [facility].     Patient was advised to call us if there was anything else we could do to assist her.     Patient verbalized understanding and agreed to plan.     Dina De La Torre RN on 9/28/2020 at 2:17 PM

## 2020-09-28 NOTE — TELEPHONE ENCOUNTER
Patient's pregnancy loss was over a month ago. In order to complete paperwork, we need to know:  When is she expecting this leave to start and end? Will she be out of work completely during this time or will it be an intermittent leave-if so, how many days/week or hours/day?  I am ok completing this for her within reason. Thank you. Maryjo BARRETO CNP

## 2020-09-28 NOTE — TELEPHONE ENCOUNTER
Unable to reach patient via phone. Left message to call clinic back at [phone] and ask for [facility].    Dina De La Torre RN on 9/28/2020 at 1:44 PM

## 2020-09-28 NOTE — TELEPHONE ENCOUNTER
"Patient attached a note to her [place] paperwork-patient stated she \"wanted to take the leave to get my mental state in the right place and to also be there for my  while he goes through all the cancer stuff.\"    Need clarification-is the mental state she is referring to due to her 's recent cancer diagnosis? If so, would either need his provider to complete the paperwork or she would need to speak with her PCP.   Maryjo BARRETO CNP      "

## 2020-09-29 NOTE — TELEPHONE ENCOUNTER
Form completed with return to work as requested. If patient needs additional time out of work to care for , any further paperwork would need to come from one of his providers. GARY carrasquillo. Maryjo BARRETO CNP

## 2020-09-29 NOTE — TELEPHONE ENCOUNTER
RN called and spoke to patient. She is in waiting room while  is in surgery right now getting thyroid cancer removed. Patient is emotional, starting to tear up while speaking with RN.     Patient states it doesn't matter when leave starts. She has no PTO so she isn't getting paid. She could put 9/9/2020 or TODAY through 12/1/2020. She is planning on being off work during this time (not intermittent).      has 6 more lumps that need to be evaluated for cancer at this time. Between this and recent miscarriage patient is overwhelmed and emotional.     RN will route to provider for review.     Dina De La Torre RN on 9/29/2020 at 11:07 AM

## 2020-10-01 LAB — COPATH REPORT: NORMAL

## 2020-10-05 ENCOUNTER — TELEPHONE (OUTPATIENT)
Dept: OBGYN | Facility: CLINIC | Age: 40
End: 2020-10-05

## 2020-10-06 NOTE — TELEPHONE ENCOUNTER
Telephone call to patient and discussed her results. Her questions were all addressed to her satisfaction, we discussed follow up recommendations. Maryjo BARRETO CNP

## 2020-10-23 ENCOUNTER — NURSE TRIAGE (OUTPATIENT)
Dept: NURSING | Facility: CLINIC | Age: 40
End: 2020-10-23

## 2020-12-17 ENCOUNTER — OFFICE VISIT (OUTPATIENT)
Dept: URGENT CARE | Facility: URGENT CARE | Age: 40
End: 2020-12-17
Payer: COMMERCIAL

## 2020-12-17 VITALS
HEART RATE: 84 BPM | SYSTOLIC BLOOD PRESSURE: 115 MMHG | DIASTOLIC BLOOD PRESSURE: 76 MMHG | WEIGHT: 121.8 LBS | BODY MASS INDEX: 18.8 KG/M2 | RESPIRATION RATE: 12 BRPM | TEMPERATURE: 98 F | OXYGEN SATURATION: 100 %

## 2020-12-17 DIAGNOSIS — H66.001 ACUTE SUPPURATIVE OTITIS MEDIA OF RIGHT EAR WITHOUT SPONTANEOUS RUPTURE OF TYMPANIC MEMBRANE, RECURRENCE NOT SPECIFIED: Primary | ICD-10-CM

## 2020-12-17 PROCEDURE — 99203 OFFICE O/P NEW LOW 30 MIN: CPT | Performed by: PHYSICIAN ASSISTANT

## 2020-12-17 RX ORDER — AMOXICILLIN 875 MG
875 TABLET ORAL 2 TIMES DAILY
Qty: 20 TABLET | Refills: 0 | Status: SHIPPED | OUTPATIENT
Start: 2020-12-17 | End: 2020-12-27

## 2020-12-17 ASSESSMENT — ENCOUNTER SYMPTOMS
MUSCULOSKELETAL NEGATIVE: 1
MYALGIAS: 0
JOINT SWELLING: 0
DIARRHEA: 0
BACK PAIN: 0
FEVER: 0
VOMITING: 0
CARDIOVASCULAR NEGATIVE: 1
DIZZINESS: 0
ARTHRALGIAS: 0
NAUSEA: 0
HEADACHES: 0
LIGHT-HEADEDNESS: 0
WOUND: 0
HEMATOLOGIC/LYMPHATIC NEGATIVE: 1
SHORTNESS OF BREATH: 0
RESPIRATORY NEGATIVE: 1
NECK STIFFNESS: 0
NECK PAIN: 0
WEAKNESS: 0
EYES NEGATIVE: 1
BRUISES/BLEEDS EASILY: 0
ALLERGIC/IMMUNOLOGIC NEGATIVE: 1
SORE THROAT: 0
CHILLS: 0
ENDOCRINE NEGATIVE: 1
PALPITATIONS: 0
RHINORRHEA: 0
COUGH: 0

## 2020-12-17 NOTE — PROGRESS NOTES
Chief Complaint:    Chief Complaint   Patient presents with     Ear Problem     Dull pain in right ear started yesterday. Has felt plugged for about 1 week          HPI:Sultana Greene is an 40 year old female who presents for possible ear infection. Symptoms include ear pain on right and plugged sensation on right. Onset 1 day, gradually worsening since that time. Ear history: few episodes of otitis.    Patient is eating and drinking well.  No fever, diarrhea or vomiting.  No cough, or wheezing.    ROS:    Review of Systems   Constitutional: Negative for chills and fever.   HENT: Positive for ear pain. Negative for congestion, rhinorrhea and sore throat.    Eyes: Negative.    Respiratory: Negative.  Negative for cough and shortness of breath.    Cardiovascular: Negative.  Negative for chest pain and palpitations.   Gastrointestinal: Negative for diarrhea, nausea and vomiting.   Endocrine: Negative.    Genitourinary: Negative.    Musculoskeletal: Negative.  Negative for arthralgias, back pain, joint swelling, myalgias, neck pain and neck stiffness.   Skin: Negative.  Negative for rash and wound.   Allergic/Immunologic: Negative.  Negative for immunocompromised state.   Neurological: Negative for dizziness, weakness, light-headedness and headaches.   Hematological: Negative.  Does not bruise/bleed easily.        Respiratory History  occasional episodes of bronchitis      Family History   History reviewed. No pertinent family history.     Problem history  Patient Active Problem List   Diagnosis     Supervision of other normal pregnancy, antepartum     Antepartum multigravida of advanced maternal age     Tobacco use     Missed         Allergies  No Known Allergies     Social History  Social History     Socioeconomic History     Marital status:      Spouse name: Gutierrez     Number of children: 4     Years of education: Not on file     Highest education level: Not on file   Occupational History      Occupation: phlebotomist     Comment: [employer]   Social Needs     Financial resource strain: Not on file     Food insecurity     Worry: Not on file     Inability: Not on file     Transportation needs     Medical: Not on file     Non-medical: Not on file   Tobacco Use     Smoking status: Current Every Day Smoker     Packs/day: 1.00     Types: Cigarettes     Smokeless tobacco: Never Used   Substance and Sexual Activity     Alcohol use: Not Currently     Drug use: Never     Sexual activity: Yes     Partners: Male     Birth control/protection: None   Lifestyle     Physical activity     Days per week: Not on file     Minutes per session: Not on file     Stress: Not on file   Relationships     Social connections     Talks on phone: Not on file     Gets together: Not on file     Attends Sabianist service: Not on file     Active member of club or organization: Not on file     Attends meetings of clubs or organizations: Not on file     Relationship status: Not on file     Intimate partner violence     Fear of current or ex partner: Not on file     Emotionally abused: Not on file     Physically abused: Not on file     Forced sexual activity: Not on file   Other Topics Concern     Not on file   Social History Narrative     Not on file        Current Meds    Current Outpatient Medications:      amoxicillin (AMOXIL) 875 MG tablet, Take 1 tablet (875 mg) by mouth 2 times daily for 10 days, Disp: 20 tablet, Rfl: 0     Physical Exam:     Vital signs reviewed by Emile Bennett PA-C  /76   Pulse 84   Temp 98  F (36.7  C) (Tympanic)   Resp 12   Wt 55.2 kg (121 lb 12.8 oz)   SpO2 100%   BMI 18.80 kg/m       Physical Exam:    Physical Exam  Vitals signs and nursing note reviewed.   Constitutional:       General: She is not in acute distress.     Appearance: She is well-developed. She is not ill-appearing, toxic-appearing or diaphoretic.   HENT:      Head: Normocephalic and atraumatic.      Right Ear: External ear  normal. No drainage, swelling or tenderness. Tympanic membrane is erythematous and bulging. Tympanic membrane is not perforated or retracted.      Left Ear: Tympanic membrane and external ear normal. No drainage, swelling or tenderness. Tympanic membrane is not perforated, erythematous, retracted or bulging.      Nose: No mucosal edema, congestion or rhinorrhea.      Right Sinus: No maxillary sinus tenderness or frontal sinus tenderness.      Left Sinus: No maxillary sinus tenderness or frontal sinus tenderness.      Mouth/Throat:      Pharynx: No pharyngeal swelling, oropharyngeal exudate, posterior oropharyngeal erythema or uvula swelling.      Tonsils: No tonsillar abscesses.   Eyes:      Pupils: Pupils are equal, round, and reactive to light.   Neck:      Musculoskeletal: Full passive range of motion without pain, normal range of motion and neck supple.      Trachea: Trachea normal.   Cardiovascular:      Rate and Rhythm: Normal rate and regular rhythm.      Heart sounds: Normal heart sounds, S1 normal and S2 normal. No murmur. No friction rub. No gallop.    Pulmonary:      Effort: Pulmonary effort is normal. No respiratory distress.      Breath sounds: Normal breath sounds. No decreased breath sounds, wheezing, rhonchi or rales.   Abdominal:      General: Bowel sounds are normal. There is no distension.      Palpations: Abdomen is soft. Abdomen is not rigid. There is no mass.      Tenderness: There is no abdominal tenderness. There is no guarding or rebound.   Lymphadenopathy:      Cervical: No cervical adenopathy.   Skin:     General: Skin is warm and dry.   Neurological:      Mental Status: She is alert and oriented to person, place, and time.      Cranial Nerves: No cranial nerve deficit.      Deep Tendon Reflexes: Reflexes are normal and symmetric.   Psychiatric:         Behavior: Behavior normal. Behavior is cooperative.         Thought Content: Thought content normal.         Judgment: Judgment normal.           ASSESSMENT     1. Acute suppurative otitis media of right ear without spontaneous rupture of tympanic membrane, recurrence not specified         PLAN  Rx for Amoxicillin today.    Fluids, vaporizer, acetaminophen, and or ibuprofen for pain.  Follow up with PCP if symptoms are not improving in 1 week. Sooner if symptoms worsen.   Worrisome symptoms discussed with instructions to go to the ED.  Patient verbalized understanding and agreed with this plan.       Emile Bennett PA-C  12/17/2020, 5:15 PM

## 2020-12-28 ENCOUNTER — OFFICE VISIT (OUTPATIENT)
Dept: URGENT CARE | Facility: URGENT CARE | Age: 40
End: 2020-12-28
Payer: COMMERCIAL

## 2020-12-28 VITALS
SYSTOLIC BLOOD PRESSURE: 115 MMHG | RESPIRATION RATE: 14 BRPM | OXYGEN SATURATION: 100 % | HEART RATE: 96 BPM | BODY MASS INDEX: 18.61 KG/M2 | TEMPERATURE: 98.4 F | DIASTOLIC BLOOD PRESSURE: 69 MMHG | WEIGHT: 120.6 LBS

## 2020-12-28 DIAGNOSIS — H65.91 OME (OTITIS MEDIA WITH EFFUSION), RIGHT: Primary | ICD-10-CM

## 2020-12-28 PROCEDURE — 99213 OFFICE O/P EST LOW 20 MIN: CPT | Performed by: PHYSICIAN ASSISTANT

## 2020-12-28 ASSESSMENT — PAIN SCALES - GENERAL: PAINLEVEL: MODERATE PAIN (5)

## 2020-12-28 NOTE — PROGRESS NOTES
SUBJECTIVE:   Sultana Greene is a 40 year old female presenting with a chief complaint of   Chief Complaint   Patient presents with     Ear Problem     Recurrent ear infection in right ear. Was seen on 12/17 and put on amoxicillin x10 days       She is an established patient of [facility].  Patient presents with ongoing pain to right ear.  Patient finished amoxicillin 2 days ago.  No new symptoms.  Decreased hearing as well.     Review of Systems   HENT: Positive for ear pain and hearing loss.    All other systems reviewed and are negative.      Past Medical History:   Diagnosis Date     Tobacco use      History reviewed. No pertinent family history.  Current Outpatient Medications   Medication Sig Dispense Refill     amoxicillin-clavulanate (AUGMENTIN) 875-125 MG tablet Take 1 tablet by mouth 2 times daily for 7 days 14 tablet 0     Social History     Tobacco Use     Smoking status: Current Every Day Smoker     Packs/day: 1.00     Types: Cigarettes     Smokeless tobacco: Never Used   Substance Use Topics     Alcohol use: Not Currently       OBJECTIVE  /69 (BP Location: Left arm, Patient Position: Sitting, Cuff Size: Adult Regular)   Pulse 96   Temp 98.4  F (36.9  C) (Tympanic)   Resp 14   Wt 54.7 kg (120 lb 9.6 oz)   SpO2 100%   BMI 18.61 kg/m      Physical Exam  Vitals signs and nursing note reviewed.   Constitutional:       Appearance: Normal appearance. She is normal weight.   HENT:      Head: Normocephalic and atraumatic.      Right Ear: Ear canal and external ear normal.      Left Ear: Tympanic membrane, ear canal and external ear normal.      Ears:      Comments: R TM mildly erythematous.  Eyes:      Extraocular Movements: Extraocular movements intact.      Conjunctiva/sclera: Conjunctivae normal.   Cardiovascular:      Rate and Rhythm: Normal rate and regular rhythm.      Pulses: Normal pulses.      Heart sounds: Normal heart sounds.   Pulmonary:      Effort: Pulmonary effort is normal.       Breath sounds: Normal breath sounds.   Skin:     General: Skin is warm and dry.      Capillary Refill: Capillary refill takes less than 2 seconds.   Neurological:      General: No focal deficit present.      Mental Status: She is alert.   Psychiatric:         Mood and Affect: Mood normal.         Behavior: Behavior normal.         Labs:  No results found for this or any previous visit (from the past 24 hour(s)).    X-Ray was not done.    ASSESSMENT:      ICD-10-CM    1. OME (otitis media with effusion), right  H65.91 amoxicillin-clavulanate (AUGMENTIN) 875-125 MG tablet        Medical Decision Making:    Differential Diagnosis:  URI Adult/Peds:  Acute right otitis media, otalgia    Serious Comorbid Conditions:  Adult:  as above    PLAN:    Rx for augmentin.  Supportive care.  Recommended decongestant.      Followup:    If not improving or if condition worsens, follow up with your Primary Care Provider, If not improving or if conditions worsens over the next 12-24 hours, go to the Emergency Department    Patient Instructions     Patient Education     Common Middle Ear Problems  Middle ear problems may be caused by something that occurs at birth or right after birth (congenital). This may be an inherited condition. Or it may be due to medicines or to a viral infection such as hepatitis, HIV, syphilis, or cytomegalovirus. Over time, certain growths or bone disease can also harm the middle ear. Left untreated, middle ear problems often lead to lifelong (permanent) hearing loss.   The ear has 3 main parts: the outer ear, middle ear, and inner ear. The middle ear is made up of:     The eardrum (tympanic membrane)    An air-filled space with bones (ossicles) that link the eardrum to the inner ear  There are 3 types of hearing loss:     Conductive hearing loss. This is caused by anything that limits outside sound from getting into the inner ear.    Sensorineural hearing loss. This type affects the inner ear (cochlea) or the  auditory nerve.    Mixed hearing loss. This is a combination of conductive and sensorineural hearing loss.    Injury, infection, certain growths, or bone disease can cause your symptoms. A ruptured eardrum or a long-lasting (chronic) ear infection may be painful and decrease hearing.   Symptoms    Hearing loss in one or both ears    Fluid, often smelly, draining from the ear    Mild pain or pressure in the ear    Ringing in the ear    Types of hearing loss  Conductive hearing loss  Sound waves may be disrupted before they get to the inner ear. If this happens, you may have conductive hearing loss. Loud sounds may be muffled. And it can be hard to hear soft sounds.   Causes can include:    Fluid in the middle ear    Ear infection (otitis media)    Infection in the ear canal (swimmer s ear or external otitis)    Earwax in the ear canal    Noncancer (benign) tumors blocking the outer or middle ear    A hole in your eardrum (perforated eardrum)    Something stuck in the outer ear    Structural problem in the outer or middle ear  This type of hearing loss can often be treated with medicine or surgery.   Sensorineural hearing loss  This is the most common type of lifelong hearing loss. It occurs after damage to the inner ear. It can also occur when there are problems with the nerves that travel from the inner ear to the brain. You may find it hard to hear soft sounds. Louder sounds may not be clear. Or they may be muffled.   Causes can include:    Illnesses    Certain medicines that damage the ear    Age-related hearing loss (presbycusis)    Family history of hearing loss    Head injury    Loud noise exposure    Structural problem in the inner ear  In some cases it may be hard to know the cause of sensorineural hearing loss. Some metabolic disorders, such as diabetes, have been linked to it. If your hearing loss is unexplained, you may need tests to help find the cause. These can include:     Blood sugar level  tests    Complete blood count (CBC)    Thyroid function tests    Syphilis blood tests  In most cases, a sensorineural hearing loss can t be fixed with medicine or surgery. Hearing aids may be needed.   Mixed hearing loss  This type occurs when a conductive hearing loss happens at the same time as a sensorineural hearing loss. It is a problem in your outer or middle ear and in your inner ear. You may not hear as well in one or both ears.   Treatment for mixed hearing loss may be a combination of medicine or surgery, as well as hearing aids.   [employer] last reviewed this educational content on 7/1/2019 2000-2020 [employer]. [address] [place] [address], [place] [address]. All rights reserved. This information is not intended as a substitute for professional medical care. Always follow your healthcare professional's instructions.

## 2020-12-28 NOTE — PATIENT INSTRUCTIONS
Patient Education     Common Middle Ear Problems  Middle ear problems may be caused by something that occurs at birth or right after birth (congenital). This may be an inherited condition. Or it may be due to medicines or to a viral infection such as hepatitis, HIV, syphilis, or cytomegalovirus. Over time, certain growths or bone disease can also harm the middle ear. Left untreated, middle ear problems often lead to lifelong (permanent) hearing loss.   The ear has 3 main parts: the outer ear, middle ear, and inner ear. The middle ear is made up of:     The eardrum (tympanic membrane)    An air-filled space with bones (ossicles) that link the eardrum to the inner ear  There are 3 types of hearing loss:     Conductive hearing loss. This is caused by anything that limits outside sound from getting into the inner ear.    Sensorineural hearing loss. This type affects the inner ear (cochlea) or the auditory nerve.    Mixed hearing loss. This is a combination of conductive and sensorineural hearing loss.    Injury, infection, certain growths, or bone disease can cause your symptoms. A ruptured eardrum or a long-lasting (chronic) ear infection may be painful and decrease hearing.   Symptoms    Hearing loss in one or both ears    Fluid, often smelly, draining from the ear    Mild pain or pressure in the ear    Ringing in the ear    Types of hearing loss  Conductive hearing loss  Sound waves may be disrupted before they get to the inner ear. If this happens, you may have conductive hearing loss. Loud sounds may be muffled. And it can be hard to hear soft sounds.   Causes can include:    Fluid in the middle ear    Ear infection (otitis media)    Infection in the ear canal (swimmer s ear or external otitis)    Earwax in the ear canal    Noncancer (benign) tumors blocking the outer or middle ear    A hole in your eardrum (perforated eardrum)    Something stuck in the outer ear    Structural problem in the outer or middle  ear  This type of hearing loss can often be treated with medicine or surgery.   Sensorineural hearing loss  This is the most common type of lifelong hearing loss. It occurs after damage to the inner ear. It can also occur when there are problems with the nerves that travel from the inner ear to the brain. You may find it hard to hear soft sounds. Louder sounds may not be clear. Or they may be muffled.   Causes can include:    Illnesses    Certain medicines that damage the ear    Age-related hearing loss (presbycusis)    Family history of hearing loss    Head injury    Loud noise exposure    Structural problem in the inner ear  In some cases it may be hard to know the cause of sensorineural hearing loss. Some metabolic disorders, such as diabetes, have been linked to it. If your hearing loss is unexplained, you may need tests to help find the cause. These can include:     Blood sugar level tests    Complete blood count (CBC)    Thyroid function tests    Syphilis blood tests  In most cases, a sensorineural hearing loss can t be fixed with medicine or surgery. Hearing aids may be needed.   Mixed hearing loss  This type occurs when a conductive hearing loss happens at the same time as a sensorineural hearing loss. It is a problem in your outer or middle ear and in your inner ear. You may not hear as well in one or both ears.   Treatment for mixed hearing loss may be a combination of medicine or surgery, as well as hearing aids.   [employer] last reviewed this educational content on 7/1/2019 2000-2020 [employer]. [address] [place] [address], [place] [address]. All rights reserved. This information is not intended as a substitute for professional medical care. Always follow your healthcare professional's instructions.

## 2021-04-02 ENCOUNTER — OFFICE VISIT (OUTPATIENT)
Dept: URGENT CARE | Facility: URGENT CARE | Age: 41
End: 2021-04-02
Payer: COMMERCIAL

## 2021-04-02 VITALS
WEIGHT: 119 LBS | RESPIRATION RATE: 16 BRPM | OXYGEN SATURATION: 98 % | BODY MASS INDEX: 18.36 KG/M2 | DIASTOLIC BLOOD PRESSURE: 75 MMHG | TEMPERATURE: 99.5 F | SYSTOLIC BLOOD PRESSURE: 113 MMHG | HEART RATE: 97 BPM

## 2021-04-02 DIAGNOSIS — S99.921A INJURY OF TOE ON RIGHT FOOT, INITIAL ENCOUNTER: Primary | ICD-10-CM

## 2021-04-02 PROCEDURE — 99213 OFFICE O/P EST LOW 20 MIN: CPT | Performed by: PHYSICIAN ASSISTANT

## 2021-04-02 RX ORDER — RIZATRIPTAN BENZOATE 5 MG/1
TABLET ORAL
COMMUNITY
Start: 2021-02-24

## 2021-04-02 ASSESSMENT — ENCOUNTER SYMPTOMS
RESPIRATORY NEGATIVE: 1
NECK PAIN: 0
EYES NEGATIVE: 1
ALLERGIC/IMMUNOLOGIC NEGATIVE: 1
WEAKNESS: 0
ENDOCRINE NEGATIVE: 1
WOUND: 0
NECK STIFFNESS: 0
SORE THROAT: 0
JOINT SWELLING: 0
PALPITATIONS: 0
CHILLS: 0
DIZZINESS: 0
HEADACHES: 0
SHORTNESS OF BREATH: 0
LIGHT-HEADEDNESS: 0
MYALGIAS: 0
ARTHRALGIAS: 1
NAUSEA: 0
RHINORRHEA: 0
BACK PAIN: 0
COUGH: 0
VOMITING: 0
CARDIOVASCULAR NEGATIVE: 1
DIARRHEA: 0
FEVER: 0

## 2021-04-02 NOTE — PROGRESS NOTES
Chief Complaint:    Chief Complaint   Patient presents with     Toe Injury     Was running after a kid and tripped over a toy, swollen and pain in right 2nd toe.         Medical Decision Making:    Differential Diagnosis:  MS Injury Pain: sprain, fracture, muscle strain, contusion and dislocation      ASSESSMENT:     1. Injury of toe on right foot, initial encounter           PLAN:     Patient declined imaging today.  Ice the affected area.  Patient given post op shoe for comfort.  Ibuprofen and or tylenol for pain.  Patient instructed to follow up with PCP in 1 week if symptoms are not improving.  Sooner if symptoms worsen.  Worrisome symptoms discussed with instructions to go to the ED.  Patient verbalized understanding and agreed with this plan.    Labs:     No results found for any visits on 04/02/21.    Current Meds:    Current Outpatient Medications:      rizatriptan (MAXALT) 5 MG tablet, Use once as needed for headache.  May repeat after 2 hours.  Maximum dose 30 mg/24 hours., Disp: , Rfl:     Allergies:  No Known Allergies    SUBJECTIVE    HPI: Sultana Greene is an 40 year old female who presents for evaluation and treatment of R 2nd toe injury.  Patient kicked a toy last night while chasing her child.  She has had R foot 2nd digit pain.  She is able to move the toe.  She has been using ice and tylenol with some relief.  No numbness or tingling in the toe.    ROS:      Review of Systems   Constitutional: Negative for chills and fever.   HENT: Negative for congestion, ear pain, rhinorrhea and sore throat.    Eyes: Negative.    Respiratory: Negative.  Negative for cough and shortness of breath.    Cardiovascular: Negative.  Negative for chest pain and palpitations.   Gastrointestinal: Negative for diarrhea, nausea and vomiting.   Endocrine: Negative.    Genitourinary: Negative.    Musculoskeletal: Positive for arthralgias. Negative for back pain, joint swelling, myalgias, neck pain and neck stiffness.    Skin: Negative.  Negative for rash and wound.   Allergic/Immunologic: Negative.  Negative for immunocompromised state.   Neurological: Negative for dizziness, weakness, light-headedness and headaches.        Family History   No family history on file.    Social History  Social History     Socioeconomic History     Marital status:      Spouse name: Gutierrez     Number of children: 4     Years of education: Not on file     Highest education level: Not on file   Occupational History     Occupation: phlebotomist     Comment: [employer]   Social Needs     Financial resource strain: Not on file     Food insecurity     Worry: Not on file     Inability: Not on file     Transportation needs     Medical: Not on file     Non-medical: Not on file   Tobacco Use     Smoking status: Current Every Day Smoker     Packs/day: 1.00     Types: Cigarettes     Smokeless tobacco: Never Used   Substance and Sexual Activity     Alcohol use: Not Currently     Drug use: Never     Sexual activity: Yes     Partners: Male     Birth control/protection: None   Lifestyle     Physical activity     Days per week: Not on file     Minutes per session: Not on file     Stress: Not on file   Relationships     Social connections     Talks on phone: Not on file     Gets together: Not on file     Attends Orthodox service: Not on file     Active member of club or organization: Not on file     Attends meetings of clubs or organizations: Not on file     Relationship status: Not on file     Intimate partner violence     Fear of current or ex partner: Not on file     Emotionally abused: Not on file     Physically abused: Not on file     Forced sexual activity: Not on file   Other Topics Concern     Not on file   Social History Narrative     Not on file        Surgical History:  Past Surgical History:   Procedure Laterality Date     COLONOSCOPY  2003, 2014     EXTRACTION(S) DENTAL       LUMPECTOMY BREAST Left 2002        Problem List:  Patient Active  Problem List   Diagnosis     Supervision of other normal pregnancy, antepartum     Antepartum multigravida of advanced maternal age     Tobacco use     Missed            OBJECTIVE:     Vital signs noted and reviewed by Emile Bennett PA-C  /75 (BP Location: Left arm, Patient Position: Sitting, Cuff Size: Adult Regular)   Pulse 97   Temp 99.5  F (37.5  C) (Tympanic)   Resp 16   Wt 54 kg (119 lb)   LMP 2021   SpO2 98%   Breastfeeding No   BMI 18.36 kg/m       PEFR:    Physical Exam  Vitals signs and nursing note reviewed.   Constitutional:       General: She is not in acute distress.     Appearance: She is well-developed. She is not ill-appearing, toxic-appearing or diaphoretic.   HENT:      Head: Normocephalic and atraumatic.      Right Ear: Tympanic membrane and external ear normal. No drainage, swelling or tenderness. Tympanic membrane is not perforated, erythematous, retracted or bulging.      Left Ear: Tympanic membrane and external ear normal. No drainage, swelling or tenderness. Tympanic membrane is not perforated, erythematous, retracted or bulging.      Nose: No mucosal edema, congestion or rhinorrhea.      Right Sinus: No maxillary sinus tenderness or frontal sinus tenderness.      Left Sinus: No maxillary sinus tenderness or frontal sinus tenderness.      Mouth/Throat:      Pharynx: No pharyngeal swelling, oropharyngeal exudate, posterior oropharyngeal erythema or uvula swelling.      Tonsils: No tonsillar abscesses.   Eyes:      Pupils: Pupils are equal, round, and reactive to light.   Neck:      Musculoskeletal: Full passive range of motion without pain, normal range of motion and neck supple.      Trachea: Trachea normal.   Cardiovascular:      Rate and Rhythm: Normal rate and regular rhythm.      Heart sounds: Normal heart sounds, S1 normal and S2 normal. No murmur. No friction rub. No gallop.    Pulmonary:      Effort: Pulmonary effort is normal. No respiratory distress.       Breath sounds: Normal breath sounds. No decreased breath sounds, wheezing, rhonchi or rales.   Abdominal:      General: Bowel sounds are normal. There is no distension.      Palpations: Abdomen is soft. Abdomen is not rigid. There is no mass.      Tenderness: There is no abdominal tenderness. There is no guarding or rebound.   Musculoskeletal:      Right foot: Normal range of motion and normal capillary refill. Tenderness, bony tenderness and swelling present. No crepitus or deformity.        Feet:    Lymphadenopathy:      Cervical: No cervical adenopathy.   Skin:     General: Skin is warm and dry.   Neurological:      Mental Status: She is alert and oriented to person, place, and time.      Cranial Nerves: No cranial nerve deficit.      Deep Tendon Reflexes: Reflexes are normal and symmetric.   Psychiatric:         Behavior: Behavior normal. Behavior is cooperative.         Thought Content: Thought content normal.         Judgment: Judgment normal.               Emile Bennett PA-C  4/2/2021, 3:39 PM

## 2021-05-25 ENCOUNTER — RECORDS - HEALTHEAST (OUTPATIENT)
Dept: ADMINISTRATIVE | Facility: CLINIC | Age: 41
End: 2021-05-25

## 2021-06-02 ENCOUNTER — HOSPITAL ENCOUNTER (EMERGENCY)
Facility: CLINIC | Age: 41
Discharge: HOME | End: 2021-06-02
Attending: EMERGENCY MEDICINE | Admitting: EMERGENCY MEDICINE
Payer: COMMERCIAL

## 2021-06-02 VITALS
SYSTOLIC BLOOD PRESSURE: 109 MMHG | RESPIRATION RATE: 16 BRPM | WEIGHT: 114 LBS | TEMPERATURE: 97.7 F | DIASTOLIC BLOOD PRESSURE: 75 MMHG | HEART RATE: 56 BPM | OXYGEN SATURATION: 100 % | BODY MASS INDEX: 17.28 KG/M2 | HEIGHT: 68 IN

## 2021-06-02 DIAGNOSIS — G43.109 MIGRAINE WITH AURA AND WITHOUT STATUS MIGRAINOSUS, NOT INTRACTABLE: ICD-10-CM

## 2021-06-02 PROCEDURE — 258N000003 HC RX IP 258 OP 636: Performed by: EMERGENCY MEDICINE

## 2021-06-02 PROCEDURE — 96374 THER/PROPH/DIAG INJ IV PUSH: CPT

## 2021-06-02 PROCEDURE — 99284 EMERGENCY DEPT VISIT MOD MDM: CPT | Mod: 25

## 2021-06-02 PROCEDURE — 99284 EMERGENCY DEPT VISIT MOD MDM: CPT | Performed by: EMERGENCY MEDICINE

## 2021-06-02 PROCEDURE — 250N000011 HC RX IP 250 OP 636: Performed by: EMERGENCY MEDICINE

## 2021-06-02 PROCEDURE — 96361 HYDRATE IV INFUSION ADD-ON: CPT

## 2021-06-02 RX ORDER — SODIUM CHLORIDE 9 MG/ML
INJECTION, SOLUTION INTRAVENOUS CONTINUOUS
Status: DISCONTINUED | OUTPATIENT
Start: 2021-06-02 | End: 2021-06-02 | Stop reason: HOSPADM

## 2021-06-02 RX ADMIN — PROCHLORPERAZINE EDISYLATE 10 MG: 5 INJECTION INTRAMUSCULAR; INTRAVENOUS at 12:20

## 2021-06-02 RX ADMIN — SODIUM CHLORIDE 1000 ML: 900 INJECTION, SOLUTION INTRAVENOUS at 13:41

## 2021-06-02 RX ADMIN — SODIUM CHLORIDE 1000 ML: 9 INJECTION, SOLUTION INTRAVENOUS at 12:20

## 2021-06-02 ASSESSMENT — ENCOUNTER SYMPTOMS
COLOR CHANGE: 0
ABDOMINAL PAIN: 0
DIFFICULTY URINATING: 0
NECK STIFFNESS: 0
SHORTNESS OF BREATH: 0
HEADACHES: 1
EYE REDNESS: 0
CONFUSION: 0
ARTHRALGIAS: 0
FEVER: 0

## 2021-06-02 ASSESSMENT — VISUAL ACUITY
OD: 20/200
OS: 20/200

## 2021-06-02 ASSESSMENT — MIFFLIN-ST. JEOR: SCORE: 1235.6

## 2021-06-02 NOTE — LETTER
June 2, 2021      To Whom It May Concern:      Sultana Greene was seen in our Emergency Department today, 06/02/21.  I expect her condition to improve over the next 1-2 days.  She may return to work/school when improved.    Sincerely,        Roger Payne MD         Patient

## 2021-06-02 NOTE — ED PROVIDER NOTES
"    [facility] EMERG[facility]MENT ([facility])  6/02/21    History     Chief Complaint   Patient presents with     Headache     The history is provided by the patient and medical records.     Sultana Greene is a 40 year old female who presents to the emergency department for evaluation of a migraine headache that began this morning.  Patient reports vision changes in both eyes.  She endorses pain on the occipital area of her head radiating toward the front of her head. She took nurtec 2 hours ago without relief. She notes that her migraine feels similar to previous migraines but notes concern because her vision has not yet returned to normal. Her vision initially became black and she now describes her vision as \"fuzzy\". Patient works here as a phlebotomist. She states that very little helps her migraine headache pain, but notes relief from an occipital nerve block in the past.       Past Medical History:   Diagnosis Date     Tobacco use        Past Surgical History:   Procedure Laterality Date     COLONOSCOPY  2003, 2014     EXTRACTION(S) DENTAL       LUMPECTOMY BREAST Left 2002       History reviewed. No pertinent family history.    Social History     Tobacco Use     Smoking status: Current Every Day Smoker     Packs/day: 1.00     Types: Cigarettes     Smokeless tobacco: Never Used   Substance Use Topics     Alcohol use: Not Currently       Current Facility-Administered Medications   Medication     sodium chloride 0.9% infusion     Current Outpatient Medications   Medication     rizatriptan (MAXALT) 5 MG tablet      No Known Allergies       Review of Systems   Constitutional: Negative for fever.   HENT: Negative for congestion.    Eyes: Positive for visual disturbance. Negative for redness.   Respiratory: Negative for shortness of breath.    Cardiovascular: Negative for chest pain.   Gastrointestinal: Negative for abdominal pain.   Genitourinary: Negative for difficulty urinating.   Musculoskeletal: " "Negative for arthralgias and neck stiffness.   Skin: Negative for color change.   Neurological: Positive for headaches.   Psychiatric/Behavioral: Negative for confusion.   All other systems reviewed and are negative.    A complete review of systems was performed with pertinent positives and negatives noted in the HPI, and all other systems negative.    Physical Exam   BP: 100/67  Pulse: 79  Temp: 97.7  F (36.5  C)  Resp: 16  Height: 172.7 cm (5' 8\")  Weight: 51.7 kg (114 lb)  SpO2: 100 %  Physical Exam  Vitals signs and nursing note reviewed.   Constitutional:       General: She is not in acute distress.  HENT:      Head: Atraumatic.      Mouth/Throat:      Mouth: Mucous membranes are moist.   Eyes:      General: No scleral icterus.     Extraocular Movements: Extraocular movements intact.   Neck:      Musculoskeletal: Neck supple.   Cardiovascular:      Rate and Rhythm: Normal rate and regular rhythm.      Heart sounds: Normal heart sounds.   Pulmonary:      Effort: Pulmonary effort is normal. No respiratory distress.   Chest:      Chest wall: No tenderness.   Abdominal:      Palpations: Abdomen is soft.      Tenderness: There is no abdominal tenderness. There is no guarding or rebound.   Musculoskeletal:      Right lower leg: No edema.      Left lower leg: No edema.   Skin:     General: Skin is warm.      Coloration: Skin is not pale.   Neurological:      General: No focal deficit present.      Mental Status: She is alert and oriented to person, place, and time.      GCS: GCS eye subscore is 4. GCS verbal subscore is 5. GCS motor subscore is 6.      Sensory: Sensation is intact.      Motor: Motor function is intact.      Coordination: Coordination is intact.      Comments: Cranial nerves intact except for decreased vision out of both eyes in all visual fields, especially centrally.         ED Course     11:52 AM  The patient was seen and examined by Roger Payne MD in Room ED24.     Procedures             "   No results found for any visits on 06/02/21.  Medications   0.9% sodium chloride BOLUS (0 mLs Intravenous Stopped 6/2/21 1341)     Followed by   0.9% sodium chloride BOLUS (0 mLs Intravenous Stopped 6/2/21 1405)     Followed by   sodium chloride 0.9% infusion (has no administration in time range)   prochlorperazine (COMPAZINE) injection 10 mg (10 mg Intravenous Given 6/2/21 1220)        Assessments & Plan (with Medical Decision Making)   The patient has worsening vision consistent with her usual migraine headaches.  She also has her usual consistent headache that she gets more than once a week.  The symptoms lasted longer than usual so she presented to the ED.  She was recently switched from Maxalt to Nurtec and took that this morning but does not feel it is helping.  We discussed multiple options including Imitrex, Maxalt, high flow oxygen, steroids, Excedrin Migraine, Toradol and Compazine.  She states that none of those have worked in the past except for maybe Compazine.  She was given IV fluids and the IV Compazine and after 90 minutes felt somewhat better with regaining her vision.  She is neurologically intact at this time.  She still has some residual headache.  She will follow up with her neurologist as soon as possible.    I have reviewed the nursing notes. I have reviewed the findings, diagnosis, plan and need for follow up with the patient.    New Prescriptions    No medications on file       Final diagnoses:   Migraine with aura and without status migrainosus, not intractable     I, Kathi Pritchard, am serving as a trained medical scribe to document services personally performed by Roger Payne MD based on the provider's statements to me on June 2, 2021.  This document has been checked and approved by the attending provider.    I, Roger Payne MD, was physically present and have reviewed and verified the accuracy of this note documented by Kathi Pritchard, medical scribe.       --  Roger Payne MD  formerly [facility] EMERGENCY DEPARTMENT  6/2/2021     Roger Payne MD  06/02/21 1655

## 2021-06-02 NOTE — DISCHARGE INSTRUCTIONS
Please make an appointment to follow up with your neurologist as soon as possible.     Continue your other medicines.  Return if new vision change, new weakness or numbness.

## 2021-06-02 NOTE — ED TRIAGE NOTES
Pt arrives to triage with complaints of migraine headache that started this morning. Pt reports vision changes in both eyes. Pt took nertec this morning with no relief. A&O. VSS

## 2022-03-30 NOTE — ED PROVIDER NOTES
History     Chief Complaint   Patient presents with     Abdominal Pain     15:20 spont.  of fetus; vaginal bleeding (miscarried 4 wks ago)     HPI  Sultana Greene is a 40 year old female with a past medical history of recently diagnosed missed  who presents to the emergency department with a chief complaint of vaginal bleeding.  The patient states that she had a missed  4 weeks ago.  She is scheduled for a D&C next week.  Around 1520 today she began having heavy vaginal bleeding and abdominal pain.  The patient notes a significant amount of bleeding.  When she developed lightheadedness, she and her  decided to come into the emergency department.  Patient declines pain medications in the emergency department.  Heart rate in the 110s on arrival to the emergency department, blood pressure stable.    I have reviewed the Medications, Allergies, Past Medical and Surgical History, and Social History in the [employer] system.    Past Medical History:   Diagnosis Date     Tobacco use      Past Surgical History:   Procedure Laterality Date     COLONOSCOPY  ,      EXTRACTION(S) DENTAL       LUMPECTOMY BREAST Left      Current Facility-Administered Medications   Medication     0.9% sodium chloride BOLUS    Followed by     sodium chloride 0.9% infusion     Current Outpatient Medications   Medication     Prenatal Vit-Fe Fumarate-FA (PRENATAL MULTIVITAMIN W/IRON) 27-0.8 MG tablet     No Known Allergies  Past medical history, past surgical history, medications, and allergies were reviewed with the patient. Additional pertinent items: None    Social History     Socioeconomic History     Marital status:      Spouse name: Gutierrez     Number of children: 4     Years of education: Not on file     Highest education level: Not on file   Occupational History     Occupation: phlebotomist     Comment: [employer]   Social Needs     Financial resource strain: Not on file     Food    History     Chief Complaint:  Fall (struck head)       ALETA Menjivar is a 84 year old female who presents via EMS with fall with head trauma.  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  She states she lost her balance and fell backwards, hitting the back of her head, her right elbow, and her right chest on the floor.  She denies syncope or loss of consciousness.  Denies nausea, vomiting, chest pain, shortness of breath, palpitations, dizziness, lightheadedness, or visual disturbance.  She currently rates her pain a 3 out of 10.  Patient denies blood thinners.     ROS:  Review of Systems   Constitutional: Negative for fever.   HENT: Negative for rhinorrhea and sore throat.    Eyes: Negative for visual disturbance.   Respiratory: Negative for cough and shortness of breath.    Cardiovascular: Negative for chest pain and palpitations.   Gastrointestinal: Negative for abdominal pain, blood in stool, diarrhea, nausea and vomiting.   Genitourinary: Negative for dysuria and hematuria.   Musculoskeletal: Positive for arthralgias (right elbow). Negative for back pain and neck pain.   Skin: Positive for wound (right elbow, scalp). Negative for rash.   Neurological: Negative for dizziness, syncope and light-headedness.   All other systems reviewed and are negative.       Allergies:  The patient has no known drug allergies     Medications:    Simvastatin    Past Medical History:    Diaphragmatic hernia without mention of obstruction or gangrene  Disorder of bone and cartilage, unspecified  Diverticulitis of colon  Esophageal reflux  Tongue lesion  Hyperlipidemia  Pulmonary nodules  CKD stage III     Past Surgical History:    Eye surgery  Hysterectomy  Appendectomy  Nephrolithiasis  Left and right thumb surgeries  Hammer toe surgery     Family History:    Father: Cancer  Mother: Eye disorder    Social History:  The  insecurity     Worry: Not on file     Inability: Not on file     Transportation needs     Medical: Not on file     Non-medical: Not on file   Tobacco Use     Smoking status: Light Tobacco Smoker     Smokeless tobacco: Never Used   Substance and Sexual Activity     Alcohol use: Not Currently     Drug use: Never     Sexual activity: Yes     Partners: Male     Birth control/protection: None   Lifestyle     Physical activity     Days per week: Not on file     Minutes per session: Not on file     Stress: Not on file   Relationships     Social connections     Talks on phone: Not on file     Gets together: Not on file     Attends Yarsanism service: Not on file     Active member of club or organization: Not on file     Attends meetings of clubs or organizations: Not on file     Relationship status: Not on file     Intimate partner violence     Fear of current or ex partner: Not on file     Emotionally abused: Not on file     Physically abused: Not on file     Forced sexual activity: Not on file   Other Topics Concern     Not on file   Social History Narrative     Not on file     Social history was reviewed with the patient. Additional pertinent items: None    Review of Systems  General: No fevers or chills  Skin: No rash or diaphoresis  Eyes: No eye redness or discharge  Ears/Nose/Throat: No rhinorrhea or nasal congestion  Respiratory: No cough or SOB  Cardiovascular: No chest pain or palpitations  Gastrointestinal: No nausea, vomiting, or diarrhea  Genitourinary: See HPI  Musculoskeletal: No arthralgias or myalgias  Neurologic: No numbness or weakness  Psychiatric: No depression or SI  Hematologic/Lymphatic/Immunologic: No leg swelling, no easy bruising/bleeding  Endocrine: No polyuria/polydypsia    A complete review of systems was performed with pertinent positives and negatives noted in the HPI, and all other systems negative.    Physical Exam   BP: 114/78  Pulse: 119  Temp: 97.3  F (36.3  C)  Resp: 18      General: Well  patient presents with her son, Daniel, who she lives with. Former smoker (quit 33 years ago).  PCP: Rox Saravia     Physical Exam     Patient Vitals for the past 24 hrs:   BP Temp Temp src Pulse Resp SpO2   03/30/22 1300 (!) 147/83 -- -- 75 -- 97 %   03/30/22 1245 (!) 147/76 -- -- -- -- 98 %   03/30/22 1200 (!) 156/81 -- -- 82 -- 97 %   03/30/22 1100 (!) 146/71 -- -- 63 -- 99 %   03/30/22 1009 (!) 149/80 98.2  F (36.8  C) Oral 75 16 97 %        Physical Exam  Vitals: Reviewed, as above.   General: Alert and oriented, in mild distress. Resting on bed.  Skin: Warm and well-perfused. No rashes or erythema. 1 cm laceration and 2 cm laceration to right elbow. Bleeding controlled.  HEENT: Head: Right occipitoparietal hematoma with overlying abrasion. Facial features symmetric. No raccoon eyes or castro's sign Eyes: Conjunctiva pink, sclera white. Ears: Auricles without lesion, erythema, or edema. EACs with some cerumen. TMs visualized bilaterally with no erythema or hemotympanum. Mouth and throat: Lips are moist with no chapping, lesions, or edema, Buccal mucosa is pink and moist without lesions.  1 cm violaceous lesion on right aspect of tongue, which patient states is chronic. Oropharyngeal mucosa is pink and moist with no erythema, edema, or exudate.   Neck: Supple with no lymphadenopathy. Full ROM.   Pulmonary: Chest wall expansion symmetric with no increased work of breathing. Lungs with breath sounds present bilaterally and clear to auscultation.   Cardiovascular: Heart RRR with no murmurs, rubs, or gallops. 2+ radial and tibialis posterior pulses bilaterally. No peripheral edema.  Abdominal: No hernias, scars, lesions, striae, or distension. Bowel sounds present and physiologic. Abdomen is soft and nontender to light and deep palpation in all 4 quadrants with no guarding or rebound. No masses or organomegaly.   Musculoskeletal: Moves all extremities spontaneously. Tender to palpation of right chest wall below  axilla. Tender to palpation of right elbow. No snuff box tenderness. Full ROM of wrist and elbow.  Neuro: Patient is alert and oriented to person place time.  Speech fluent with normal cognition.  Cranial nerves II through XII intact   PERRL, EOMI, symmetric smile, equal eye squeeze and forehead raise, normal sensation in the V1 V2 V3 distribution, grossly equal hearing, midline tongue protrusion with normal side to side movement, full strength  with head turn, normal shoulder shrug.  RUE strength 5/5: , elbow flexion/extension, wrist flexion/extension  LUE strength 5/5: , elbow flexion/extension, wrist flexion/extension  RLE strength 5/5: Ankle flexion/extension, knee flexion/extension, hip flexion/extension  LLE strength 5/5: Ankle flexion/extension, knee flexion/extension, hip flexion/extension  No pronator drift, normal rapid alternating movements normal finger-to-nose normal heel-to-shin.   Psych: Affect appropriate.  Answers questions appropriately. Patient appears calm.      Emergency Department Course   ECG  ECG taken at 1152, ECG read at 1139  Sinus rhythm with premature atrial complexes  Nonspecific ST abnormality  Abnormal ECG   No significant change as compared to prior, dated 5/2917.  Rate 84 bpm. WA interval 150 ms. QRS duration 90 ms. QT/QTc 386/456 ms. P-R-T axes 56 50 36.     Imaging:  CT Chest w Contrast   Final Result   IMPRESSION:    1.  Acute nondisplaced right fourth, sixth, seventh and eighth rib   fractures.   2.  No other traumatic injury in the chest.      HONEY SOTELO MD            SYSTEM ID:  [id number]      Elbow XR, G/E 3 views, right   Preliminary Result   IMPRESSION: Small corticated ossicle adjacent to the medial epicondyle   likely related to prior injury. No evidence of acute fracture or joint   effusion.      Soft tissue swelling adjacent to the olecranon process with a small   amount of soft tissue air consistent with laceration.      CT Head w/o Contrast   Final Result  nourished, well developed, patient appears to be in moderate distress secondary to pain/appears pale  HEENT: EOMI, anicteric. NCAT  Neck: no jugular venous distension, supple, nl ROM  Cardiac: Echocardiac rate, regular rhythm. No murmurs, rubs, or gallops. Normal S1, S2.  Intact peripheral pulses  Pulm: CTAB, no stridor, wheezes, rales, rhonchi  Abd: Soft, tenderness to palpation in the suprapubic/pelvic region without rebound or guarding, nondistended.  No masses palpated.  Genitourinary: External exam is normal, moderate amount of vaginal bleeding is present.  A full internal exam was not performed.  Skin: Warm and dry to the touch.  No rash, pallor is present  Extremities: No LE edema, no cyanosis, w/w/p  Neuro: A&Ox3, no gross focal deficits    ED Course        Procedures             EKG Interpretation:      Interpreted by Serenity Leong MD  Time reviewed: 1745  Symptoms at time of EKG: lightheadness   Rhythm: normal sinus   Rate: normal, 85 bpm  Axis: normal  Ectopy: none  Conduction: normal  ST Segments/ T Waves: No ST-T wave changes  Q Waves: none  Comparison to prior: Unchanged from 1/20/2020    Clinical Impression: normal EKG                        Labs Ordered and Resulted from Time of ED Arrival Up to the Time of Departure from the ED   CBC WITH PLATELETS DIFFERENTIAL - Abnormal; Notable for the following components:       Result Value    WBC 15.5 (*)     Absolute Neutrophil 13.2 (*)     All other components within normal limits   UA MACROSCOPIC WITH REFLEX TO MICRO AND CULTURE - Abnormal; Notable for the following components:    Ketones Urine 5 (*)     Blood Urine Large (*)     Protein Albumin Urine 100 (*)     Leukocyte Esterase Urine Small (*)     RBC Urine >182 (*)     Mucous Urine Present (*)     All other components within normal limits   HCG QUANTITATIVE PREGNANCY - Abnormal; Notable for the following components:    HCG Quantitative Serum 703 (*)     All other components within normal limits    IMPRESSION:      1. No evidence of acute intracranial hemorrhage, mass, or herniation.   2. Moderate diffuse parenchymal volume loss and mild white matter   changes likely due to chronic microvascular ischemic disease.         ELIZABETH DOUGHERTY MD            SYSTEM ID:  [id number]         Report per radiology    Laboratory:  Labs Ordered and Resulted from Time of ED Arrival to Time of ED Departure   BASIC METABOLIC PANEL - Abnormal       Result Value    Sodium 140      Potassium 3.9      Chloride 103      Carbon Dioxide (CO2) 30      Anion Gap 7      Urea Nitrogen 20      Creatinine 1.27 (*)     Calcium 9.5      Glucose 161 (*)     GFR Estimate 41 (*)    CBC WITH PLATELETS AND DIFFERENTIAL - Abnormal    WBC Count 9.1      RBC Count 4.15      Hemoglobin 11.4 (*)     Hematocrit 35.6      MCV 86      MCH 27.5      MCHC 32.0      RDW 15.4 (*)     Platelet Count 306      % Neutrophils 77      % Lymphocytes 13      % Monocytes 7      % Eosinophils 2      % Basophils 0      % Immature Granulocytes 1      NRBCs per 100 WBC 0      Absolute Neutrophils 7.1      Absolute Lymphocytes 1.2      Absolute Monocytes 0.6      Absolute Eosinophils 0.2      Absolute Basophils 0.0      Absolute Immature Granulocytes 0.1      Absolute NRBCs 0.0     ISTAT CREATININE POCT        [facility]    -Laceration Repair    Date/Time: 3/30/2022 1:03 PM  Performed by: Brook Peña PA-C  Authorized by: Brook Peña PA-C     Risks, benefits and alternatives discussed.      ANESTHESIA (see MAR for exact dosages):     Anesthesia method:  Local infiltration    Local anesthetic:  Lidocaine 1% WITH epi  LACERATION DETAILS     Location: right elbow.    Length (cm):  1    Depth (mm):  3  EXPLORATION:     Hemostasis achieved with:  Direct pressure    Wound exploration: wound explored through full range of motion and entire depth of wound probed and visualized      Contaminated: no      TREATMENT:     Area cleansed with:  Saline and    INR   PARTIAL THROMBOPLASTIN TIME   COMPREHENSIVE METABOLIC PANEL   LIPASE   LACTIC ACID WHOLE BLOOD   SURGICAL PATHOLOGY EXAM   CHROMOSOME SKIN PRODUCTS OF CONCEPTION   PERIPHERAL IV CATHETER   ABO/RH TYPE AND SCREEN            Results for orders placed or performed during the hospital encounter of 08/21/20 (from the past 24 hour(s))   CBC with platelets differential   Result Value Ref Range    WBC 15.5 (H) 4.0 - 11.0 10e9/L    RBC Count 4.37 3.8 - 5.2 10e12/L    Hemoglobin 14.0 11.7 - 15.7 g/dL    Hematocrit 43.2 35.0 - 47.0 %    MCV 99 78 - 100 fl    MCH 32.0 26.5 - 33.0 pg    MCHC 32.4 31.5 - 36.5 g/dL    RDW 12.3 10.0 - 15.0 %    Platelet Count 253 150 - 450 10e9/L    Diff Method Automated Method     % Neutrophils 85.6 %    % Lymphocytes 8.4 %    % Monocytes 4.6 %    % Eosinophils 0.5 %    % Basophils 0.2 %    % Immature Granulocytes 0.7 %    Nucleated RBCs 0 0 /100    Absolute Neutrophil 13.2 (H) 1.6 - 8.3 10e9/L    Absolute Lymphocytes 1.3 0.8 - 5.3 10e9/L    Absolute Monocytes 0.7 0.0 - 1.3 10e9/L    Absolute Eosinophils 0.1 0.0 - 0.7 10e9/L    Absolute Basophils 0.0 0.0 - 0.2 10e9/L    Abs Immature Granulocytes 0.1 0 - 0.4 10e9/L    Absolute Nucleated RBC 0.0    INR   Result Value Ref Range    INR 1.03 0.86 - 1.14   Partial thromboplastin time   Result Value Ref Range    PTT 25 22 - 37 sec   ABO/Rh type and screen   Result Value Ref Range    ABO A     RH(D) Pos     Antibody Screen Neg     Test Valid Only At          [facility] [place]    Specimen Expires 08/24/2020    Comprehensive metabolic panel   Result Value Ref Range    Sodium 136 133 - 144 mmol/L    Potassium 3.5 3.4 - 5.3 mmol/L    Chloride 106 94 - 109 mmol/L    Carbon Dioxide 25 20 - 32 mmol/L    Anion Gap 5 3 - 14 mmol/L    Glucose 96 70 - 99 mg/dL    Urea Nitrogen 10 7 - 30 mg/dL    Creatinine 0.74 0.52 - 1.04 mg/dL    GFR Estimate >90 >60 mL/min/[1.73_m2]    GFR Estimate If Black >90 >60 mL/min/[1.73_m2]     Calcium 9.8 8.5 - 10.1 mg/dL    Bilirubin Total 0.4 0.2 - 1.3 mg/dL    Albumin 3.4 3.4 - 5.0 g/dL    Protein Total 7.6 6.8 - 8.8 g/dL    Alkaline Phosphatase 52 40 - 150 U/L    ALT 21 0 - 50 U/L    AST 16 0 - 45 U/L   Lipase   Result Value Ref Range    Lipase 119 73 - 393 U/L   Lactic acid whole blood   Result Value Ref Range    Lactic Acid 1.8 0.7 - 2.0 mmol/L   HCG quantitative pregnancy   Result Value Ref Range    HCG Quantitative Serum 703 (H) 0 - 5 IU/L   EKG 12-lead, tracing only   Result Value Ref Range    Interpretation ECG Click View Image link to view waveform and result    US Pelvic Complete with Transvaginal    Narrative    EXAMINATION: US PELVIC COMPLETE WITH TRANSVAGINAL, 8/21/2020 6:30 PM     COMPARISON: Ultrasound dated 8/19/2020.    HISTORY: eval for retained products    TECHNIQUE: The pelvis was scanned in standard fashion with  transabdominal and transvaginal transducer(s) using both grey scale  and limited color Doppler techniques.    FINDINGS:  The uterus measures 10.6 x 7.6 x 8.7 cm, and there is no evidence of a  focal fibroid.  The endometrium measures up to 16 mm in thickness. No  retained intrauterine products of conception. There is no free fluid  in the pelvis.    The right ovary measures 3.2 x 2.2 x 3.1 cm and the left ovary  measures 2.7 x 12.0 x 1.6 cm. Right ovarian cyst measuring 1.4 x 1.4 x  1.6 cm. There is no adnexal mass. There is normal blood flow to the  ovaries.      Impression    IMPRESSION: No evidence for retained intrauterine products of  conception.    I have personally reviewed the examination and initial interpretation  and I agree with the findings.    NIKUNJ AUSTIN MD   UA reflex to Microscopic and Culture    Specimen: Urine clean catch; Midstream Urine   Result Value Ref Range    Color Urine Orange     Appearance Urine Slightly Cloudy     Glucose Urine Negative NEG^Negative mg/dL    Bilirubin Urine Negative NEG^Negative    Ketones Urine 5 (A) NEG^Negative  Shur-Clens    Amount of cleaning:  Standard    Visualized foreign bodies/material removed: no      SKIN REPAIR     Repair method:  Sutures    Suture size:  3-0    Suture material:  Nylon    Suture technique:  Simple interrupted    Number of sutures:  2    APPROXIMATION     Approximation:  Close    POST-PROCEDURE DETAILS     Dressing:  Non-adherent dressing and antibiotic ointment        PROCEDURE    Patient Tolerance:  Patient tolerated the procedure well with no [facility]    -Laceration Repair    Date/Time: 3/30/2022 1:04 PM  Performed by: Brook Peña PA-C  Authorized by: Brook Peña PA-C     Risks, benefits and alternatives discussed.      ANESTHESIA (see MAR for exact dosages):     Anesthesia method:  Local infiltration    Local anesthetic:  Lidocaine 1% WITH epi  LACERATION DETAILS     Location: right elbow.    Length (cm):  2    Depth (mm):  5    REPAIR TYPE:     Repair type:  Simple      EXPLORATION:     Hemostasis achieved with:  Direct pressure    Wound exploration: wound explored through full range of motion and entire depth of wound probed and visualized      Contaminated: no      TREATMENT:     Area cleansed with:  Saline and Shur-Clens    Amount of cleaning:  Standard    Visualized foreign bodies/material removed: no      SKIN REPAIR     Repair method:  Sutures    Suture size:  3-0    Suture material:  Nylon    Suture technique:  Simple interrupted    Number of sutures:  3    APPROXIMATION     Approximation:  Close    POST-PROCEDURE DETAILS     Dressing:  Antibiotic ointment and non-adherent dressing        PROCEDURE    Patient Tolerance:  Patient tolerated the procedure well with no immediate complications         Emergency Department Course:     Reviewed:  I reviewed nursing notes, vitals and past medical history    Assessments:  1012 I obtained history and examined the patient as noted above.   1145 Dr. Rodriguez's evaluation.  1240 I rechecked and  "explained findings and applied sutures, according to procedure note.  1340 I rechecked the patient. She had improvement in her pain following oxycodone. I explained the plan and reviewed plan for discharge.      Interventions:  1037 Tylenol, 1000 mg, PO   1240 Lidocaine with epinephrine, 2.5 mg, local infiltration  1255 Oxicodone 2.5 mg, PO    Disposition:  The patient was discharged to home.     Impression & Plan    CMS Diagnoses: None    Medical Decision Making:  Michelle is a 84 year old female who presents via EMS with a mechanical fall with head trauma.  Please see HPI and physical exam for full details.  Differential diagnosis includes intracranial hemorrhage, skull fracture, rib fractures, elbow fractures, soft tissue injuries, among others.  Patient has a nonfocal neurologic exam.  Head CT was obtained, which is negative for acute process.  Elbow x-ray was obtained, which was negative for acute fracture.  Chest CT was obtained, which showed for nondisplaced right rib fractures.  Patient initially on Tylenol for pain.  She rated her pain a 5 out of 10 on reevaluation.  She was provided with 2.5 mg of oxycodone.  I offered her hospital admission for pain management, as anticipate her pain will be more severe in the morning.  She lives with her son, who is able to help her with her ADLs.  She states she is \"99%\" independent.  She states she lives on 1 level and does not have to deal with stairs.  I advised her that she can take ibuprofen and Tylenol on a scheduled basis, and I provided her with a few tablets of oxycodone to use for more severe breakthrough pain.  I also provided her with an incentive spirometer, and nursing staff provided education on this device.  I explained the importance of using incentive spirometer to encourage full lung inflation and to avoid complications including pneumonia.  Patient had 2 lacerations on her right elbow, which necessitated suture repair.  These were repaired according to " mg/dL    Specific Gravity Urine 1.023 1.003 - 1.035    Blood Urine Large (A) NEG^Negative    pH Urine 7.0 5.0 - 7.0 pH    Protein Albumin Urine 100 (A) NEG^Negative mg/dL    Urobilinogen mg/dL 2.0 0.0 - 2.0 mg/dL    Nitrite Urine Negative NEG^Negative    Leukocyte Esterase Urine Small (A) NEG^Negative    Source Midstream Urine     RBC Urine >182 (H) 0 - 2 /HPF    WBC Urine 5 0 - 5 /HPF    Squamous Epithelial /HPF Urine 1 0 - 1 /HPF    Mucous Urine Present (A) NEG^Negative /LPF       Labs, vital signs, and imaging studies were reviewed by me.    Medications   0.9% sodium chloride BOLUS (has no administration in time range)     Followed by   sodium chloride 0.9% infusion (has no administration in time range)       Assessments & Plan (with Medical Decision Making)   Sultana Greene is a 40 year old female who presents with vaginal bleeding.  Patient was recently diagnosed with missed  and began to pass blood and fetal tissue today.  Patient is tachycardic but not hypotensive on arrival.  She does appear pale.  IV fluids were given.  Labs and pelvic ultrasound were ordered to further evaluate the patient.  An OB/GYN was contacted upon patient's arrival to a room in the emergency department.  Patient declined pain medications in the emergency department.    153 patient was discussed with OB/GYN, they will come to the emergency department to evaluate the patient.    Laboratory work-up is remarkable for leukocytosis.  Hemoglobin is stable.  Ultrasound shows no retained products of conception.    I have reviewed the nursing notes.    I have reviewed the findings, diagnosis, plan and need for follow up with the patient.    191 patient was reevaluated, she appears much more comfortable.  States she passed a final clot just before ultrasound was performed and now pain has been alleviated and bleeding is controlled.  OB/GYN has seen her in the emergency department.  Will discuss recommendations with  procedure notes.  I instructed the patient to follow-up with her primary care provider to have the sutures removed in 10 days. Return precautions otherwise were discussed in detail, including erythema, edema, exudate, fevers, chills, nausea, vomiting, as well as severe pain that she cannot manage at home from her rib fractures.  Patient and her son expressed understanding of this plan.  All questions were answered.  Patient was discharged to home in stable condition.     Diagnosis:    ICD-10-CM    1. Fall, initial encounter  W19.XXXA    2. Injury of head, initial encounter  S09.90XA    3. Elbow pain, right  M25.521    4. Laceration of right elbow, initial encounter  S51.011A    5. Closed fracture of multiple ribs of right side, initial encounter  S22.41XA         Discharge Medications:  New Prescriptions    OXYCODONE (ROXICODONE) 5 MG TABLET    Take 0.5 tablets (2.5 mg) by mouth every 6 hours as needed for breakthrough pain     Scribe Disclosure:  RUSSELL, Lauryn Mock, am serving as a scribe at 11:55 AM on 3/30/2022 to document services personally performed by Brook Peña PA-C based on my observations and the provider's statements to me.       Brook Peña PA-C  03/30/22 [id number]     them.    Patient was discussed with OB/GYN, they have seen the patient in the emergency department.  Recommend 400 mg of Cytotec p.o. they will order this medication.  They also recommend that products of conception be sent down for genetic analysis/pathology examination.  Patient signed consent form while in the emergency department.    Vital signs improved on recheck.    Patient to be discharged home. Advised to follow up with OB GYN as directed.  To return to ER immediately with any new/worsening symptoms. Plan of care discussed with patient who expresses understanding and agrees with plan of care.  .     Discharge Medication List as of 2020  8:58 PM          Final diagnoses:   Spontaneous        2020   [facility], [place], EMERGENCY DEPARTMENT     Serenity Leong MD  [id number]